# Patient Record
Sex: MALE | Employment: UNEMPLOYED | URBAN - METROPOLITAN AREA
[De-identification: names, ages, dates, MRNs, and addresses within clinical notes are randomized per-mention and may not be internally consistent; named-entity substitution may affect disease eponyms.]

---

## 2017-03-20 ENCOUNTER — OFFICE VISIT (OUTPATIENT)
Dept: INTERNAL MEDICINE CLINIC | Age: 37
End: 2017-03-20

## 2017-03-20 VITALS
HEART RATE: 96 BPM | WEIGHT: 125.2 LBS | BODY MASS INDEX: 20.12 KG/M2 | RESPIRATION RATE: 16 BRPM | TEMPERATURE: 98.6 F | OXYGEN SATURATION: 99 % | SYSTOLIC BLOOD PRESSURE: 128 MMHG | HEIGHT: 66 IN | DIASTOLIC BLOOD PRESSURE: 88 MMHG

## 2017-03-20 DIAGNOSIS — Z76.89 ENCOUNTER TO ESTABLISH CARE WITH NEW DOCTOR: Primary | ICD-10-CM

## 2017-03-20 DIAGNOSIS — I10 ESSENTIAL HYPERTENSION: ICD-10-CM

## 2017-03-20 DIAGNOSIS — Z72.0 TOBACCO ABUSE: ICD-10-CM

## 2017-03-20 DIAGNOSIS — J44.9 CHRONIC OBSTRUCTIVE PULMONARY DISEASE, UNSPECIFIED COPD TYPE (HCC): ICD-10-CM

## 2017-03-20 DIAGNOSIS — R56.9 CONVULSIONS, UNSPECIFIED CONVULSION TYPE (HCC): ICD-10-CM

## 2017-03-20 DIAGNOSIS — F20.9 SCHIZOPHRENIA, UNSPECIFIED TYPE (HCC): ICD-10-CM

## 2017-03-20 DIAGNOSIS — Z13.9 SCREENING: ICD-10-CM

## 2017-03-20 RX ORDER — TRAZODONE HYDROCHLORIDE 50 MG/1
50 TABLET ORAL
Qty: 30 TAB | Refills: 1 | Status: SHIPPED | OUTPATIENT
Start: 2017-03-20 | End: 2018-05-16 | Stop reason: ALTCHOICE

## 2017-03-20 RX ORDER — BUDESONIDE AND FORMOTEROL FUMARATE DIHYDRATE 160; 4.5 UG/1; UG/1
AEROSOL RESPIRATORY (INHALATION)
Refills: 3 | COMMUNITY
Start: 2017-03-10 | End: 2018-05-16 | Stop reason: SDUPTHER

## 2017-03-20 RX ORDER — AZITHROMYCIN 250 MG/1
TABLET, FILM COATED ORAL
Refills: 3 | COMMUNITY
Start: 2017-03-10 | End: 2018-05-16 | Stop reason: ALTCHOICE

## 2017-03-20 RX ORDER — ALBUTEROL SULFATE 90 UG/1
AEROSOL, METERED RESPIRATORY (INHALATION)
Refills: 3 | COMMUNITY
Start: 2017-03-10 | End: 2018-06-27 | Stop reason: SDUPTHER

## 2017-03-20 RX ORDER — AMLODIPINE BESYLATE 5 MG/1
TABLET ORAL
Refills: 0 | COMMUNITY
Start: 2017-03-08 | End: 2018-06-11 | Stop reason: SDUPTHER

## 2017-03-20 NOTE — PROGRESS NOTES
Chief Complaint   Patient presents with   [address]     Pt is a new patient. Pt concerned about his BP, pt states he has seizures and needs medication to control them.
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
## 2017-03-20 NOTE — MR AVS SNAPSHOT
Visit Information Date & Time Provider Department Dept. Phone Encounter #  
 3/20/2017 10:15 AM Ted Slavador MD [facility] [address] [id number] Follow-up Instructions Return in about 1 month (around 4/20/2017) for Physical . Upcoming Health Maintenance Date Due Pneumococcal 19-64 Medium Risk (1 of 1 - PPSV23) 9/16/1997 DTaP/Tdap/Td series (1 - Tdap) 9/16/1999 INFLUENZA AGE 9 TO ADULT 8/1/2016 Allergies as of 3/20/2017  Review Complete On: 3/20/2017 By: Roby Richmond LPN Severity Noted Reaction Type Reactions Codeine  01/05/2011    Other (comments) Seroquel [Quetiapine]  01/05/2011    Other (comments) tremors Current Immunizations  Reviewed on 1/22/2013 No immunizations on file. Not reviewed this visit You Were Diagnosed With   
  
 Codes Comments Encounter to establish care with new doctor    -  Primary ICD-10-CM: Z71.89 ICD-9-CM: V65.8 Chronic obstructive pulmonary disease, unspecified COPD type ([facility]     ICD-10-CM: J44.9 ICD-9-CM: 855 Convulsions, unspecified convulsion type ([facility]     ICD-10-CM: R56.9 ICD-9-CM: 780.39 Schizophrenia, unspecified type ([facility]     ICD-10-CM: F20.9 Essential hypertension     ICD-10-CM: I10 
ICD-9-CM: 401.9 Tobacco abuse     ICD-10-CM: Z72.0 ICD-9-CM: 305.1 Screening     ICD-10-CM: Z13.9 ICD-9-CM: V82.9 Vitals BP Pulse Temp Resp Height(growth percentile) Weight(growth percentile) 128/88 (BP 1 Location: Right arm, BP Patient Position: Sitting) 96 98.6 °F (37 °C) (Oral) 16 5' 6\" (1.676 m) 125 lb 3.2 oz (56.8 kg) SpO2 BMI Smoking Status 99% 20.21 kg/m2 Current Every Day Smoker Vitals History BMI and BSA Data Body Mass Index Body Surface Area  
 20.21 kg/m 2 1.63 m 2 Preferred Pharmacy Pharmacy Name Phone  [address], [address] [facility] Sue Aguilera [phone] Your Updated Medication List  
  
   
This list is accurate as of: 3/20/17 11:20 AM.  Always use your most recent med list. amLODIPine 5 mg tablet Commonly known as:  [place] TK 1 T PO QD  
  
 azithromycin 250 mg tablet Commonly known as:  ZITHROMAX  
  
 guaiFENesin 1,200 mg Ta12 ER tablet Commonly known as:  Matthew & Matthew TK 1 T PO TWICE A DAY  
  
 SYMBICORT 160-4.5 mcg/actuation HFA inhaler Generic drug:  budesonide-formoterol INHALE 2 PUFFS PO 2 TIMES DAILY. RINSE MOUTH AFTER USE  
  
 traZODone 50 mg tablet Commonly known as:  García Viviane Take 1 Tab by mouth nightly. VENTOLIN HFA 90 mcg/actuation inhaler Generic drug:  albuterol INL 2 PFS PO Q 4 H PRN Prescriptions Printed Refills  
 traZODone (DESYREL) 50 mg tablet 1 Sig: Take 1 Tab by mouth nightly. Class: Print Route: Oral  
  
We Performed the Following CBC WITH AUTOMATED DIFF [91033 CPT(R)] HIV 1/2 AG/AB, 4TH GENERATION,W RFLX CONFIRM [[id number] Custom] METABOLIC PANEL, COMPREHENSIVE [41149 CPT(R)] RPR [73590 CPT(R)] TSH 3RD GENERATION [64017 CPT(R)] URINALYSIS W/MICROSCOPIC [14042 CPT(R)] Follow-up Instructions Return in about 1 month (around 4/20/2017) for Physical . Introducing [facility] & HEALTH SERVICES! Raine Gross introduces [email or web address] patient portal. Now you can access parts of your medical record, email your doctor's office, and request medication refills online. 1. In your internet browser, go to [email or web address] 2. Click on the First Time User? Click Here link in the Sign In box. You will see the New Member Sign Up page. 3. Enter your [email or web address] Access Code exactly as it appears below. You will not need to use this code after youve completed the sign-up process. If you do not sign up before the expiration date, you must request a new code.  
 
· [email or web address] Access Code: [email or web address] 
 Expires: 6/18/2017 10:58 AM 
 
4. Enter the last four digits of your Social Security Number (xxxx) and Date of Birth (mm/dd/yyyy) as indicated and click Submit. You will be taken to the next sign-up page. 5. Create a SocialMedia305 ID. This will be your [email or web address] login ID and cannot be changed, so think of one that is secure and easy to remember. 6. Create a SocialMedia305 password. You can change your password at any time. 7. Enter your Password Reset Question and Answer. This can be used at a later time if you forget your password. 8. Enter your e-mail address. You will receive e-mail notification when new information is available in [address] 9. Click Sign Up. You can now view and download portions of your medical record. 10. Click the Download Summary menu link to download a portable copy of your medical information. If you have questions, please visit the Frequently Asked Questions section of the [email or web address] website. Remember, SocialMedia305 is NOT to be used for urgent needs. For medical emergencies, dial 911. Now available from your iPhone and Android! Please provide this summary of care documentation to your next provider. Your primary care clinician is listed as Mouna Bailey. If you have any questions after today's visit, please call [phone].

## 2017-03-21 LAB
ALBUMIN SERPL-MCNC: 4.3 G/DL (ref 3.5–5.5)
ALBUMIN/GLOB SERPL: 1.7 {RATIO} (ref 1.2–2.2)
ALP SERPL-CCNC: 79 IU/L (ref 39–117)
ALT SERPL-CCNC: 24 IU/L (ref 0–44)
APPEARANCE UR: CLEAR
AST SERPL-CCNC: 16 IU/L (ref 0–40)
BACTERIA #/AREA URNS HPF: NORMAL /[HPF]
BASOPHILS # BLD AUTO: 0 X10E3/UL (ref 0–0.2)
BASOPHILS NFR BLD AUTO: 0 %
BILIRUB SERPL-MCNC: <0.2 MG/DL (ref 0–1.2)
BILIRUB UR QL STRIP: NEGATIVE
BUN SERPL-MCNC: 13 MG/DL (ref 6–20)
BUN/CREAT SERPL: 15 (ref 8–19)
CALCIUM SERPL-MCNC: 9.4 MG/DL (ref 8.7–10.2)
CASTS URNS QL MICRO: NORMAL /LPF
CHLORIDE SERPL-SCNC: 100 MMOL/L (ref 96–106)
CO2 SERPL-SCNC: 23 MMOL/L (ref 18–29)
COLOR UR: YELLOW
CREAT SERPL-MCNC: 0.87 MG/DL (ref 0.76–1.27)
EOSINOPHIL # BLD AUTO: 0.1 X10E3/UL (ref 0–0.4)
EOSINOPHIL NFR BLD AUTO: 0 %
EPI CELLS #/AREA URNS HPF: NORMAL /HPF
ERYTHROCYTE [DISTWIDTH] IN BLOOD BY AUTOMATED COUNT: 22.1 % (ref 12.3–15.4)
GLOBULIN SER CALC-MCNC: 2.5 G/DL (ref 1.5–4.5)
GLUCOSE SERPL-MCNC: 115 MG/DL (ref 65–99)
GLUCOSE UR QL: NEGATIVE
HCT VFR BLD AUTO: 35.7 % (ref 37.5–51)
HGB BLD-MCNC: 11.3 G/DL (ref 12.6–17.7)
HGB UR QL STRIP: NEGATIVE
HIV 1+2 AB+HIV1 P24 AG SERPL QL IA: NON REACTIVE
IMM GRANULOCYTES # BLD: 0 X10E3/UL (ref 0–0.1)
IMM GRANULOCYTES NFR BLD: 0 %
KETONES UR QL STRIP: NEGATIVE
LEUKOCYTE ESTERASE UR QL STRIP: NEGATIVE
LYMPHOCYTES # BLD AUTO: 2.6 X10E3/UL (ref 0.7–3.1)
LYMPHOCYTES NFR BLD AUTO: 21 %
MCH RBC QN AUTO: 25.7 PG (ref 26.6–33)
MCHC RBC AUTO-ENTMCNC: 31.7 G/DL (ref 31.5–35.7)
MCV RBC AUTO: 81 FL (ref 79–97)
MICRO URNS: NORMAL
MICRO URNS: NORMAL
MONOCYTES # BLD AUTO: 0.8 X10E3/UL (ref 0.1–0.9)
MONOCYTES NFR BLD AUTO: 7 %
MORPHOLOGY BLD-IMP: ABNORMAL
MUCOUS THREADS URNS QL MICRO: PRESENT
NEUTROPHILS # BLD AUTO: 8.9 X10E3/UL (ref 1.4–7)
NEUTROPHILS NFR BLD AUTO: 72 %
NITRITE UR QL STRIP: NEGATIVE
PH UR STRIP: 6 [PH] (ref 5–7.5)
PLATELET # BLD AUTO: 478 X10E3/UL (ref 150–379)
POTASSIUM SERPL-SCNC: 4.3 MMOL/L (ref 3.5–5.2)
PROT SERPL-MCNC: 6.8 G/DL (ref 6–8.5)
PROT UR QL STRIP: NEGATIVE
RBC # BLD AUTO: 4.4 X10E6/UL (ref 4.14–5.8)
RBC #/AREA URNS HPF: NORMAL /HPF
RPR SER QL: NON REACTIVE
SODIUM SERPL-SCNC: 141 MMOL/L (ref 134–144)
SP GR UR: 1.02 (ref 1–1.03)
TSH SERPL DL<=0.005 MIU/L-ACNC: 0.88 UIU/ML (ref 0.45–4.5)
UROBILINOGEN UR STRIP-MCNC: 0.2 MG/DL (ref 0.2–1)
WBC # BLD AUTO: 12.4 X10E3/UL (ref 3.4–10.8)
WBC #/AREA URNS HPF: NORMAL /HPF

## 2017-05-11 ENCOUNTER — TELEPHONE (OUTPATIENT)
Dept: INTERNAL MEDICINE CLINIC | Age: 37
End: 2017-05-11
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
## 2018-01-01 ENCOUNTER — TELEPHONE (OUTPATIENT)
Dept: INTERNAL MEDICINE CLINIC | Age: 38
End: 2018-01-01

## 2018-01-01 ENCOUNTER — ANESTHESIA EVENT (OUTPATIENT)
Dept: ENDOSCOPY | Age: 38
End: 2018-01-01
Payer: MEDICAID

## 2018-01-01 ENCOUNTER — TELEPHONE (OUTPATIENT)
Dept: BEHAVIORAL/MENTAL HEALTH CLINIC | Age: 38
End: 2018-01-01

## 2018-01-01 ENCOUNTER — OFFICE VISIT (OUTPATIENT)
Dept: NEUROLOGY | Age: 38
End: 2018-01-01

## 2018-01-01 ENCOUNTER — OFFICE VISIT (OUTPATIENT)
Dept: BEHAVIORAL/MENTAL HEALTH CLINIC | Age: 38
End: 2018-01-01

## 2018-01-01 ENCOUNTER — OFFICE VISIT (OUTPATIENT)
Dept: INTERNAL MEDICINE CLINIC | Age: 38
End: 2018-01-01

## 2018-01-01 ENCOUNTER — ANESTHESIA (OUTPATIENT)
Dept: ENDOSCOPY | Age: 38
End: 2018-01-01
Payer: MEDICAID

## 2018-01-01 ENCOUNTER — HOSPITAL ENCOUNTER (OUTPATIENT)
Age: 38
Setting detail: OUTPATIENT SURGERY
Discharge: HOME | End: 2018-10-15
Attending: SPECIALIST | Admitting: SPECIALIST
Payer: MEDICAID

## 2018-01-01 VITALS
SYSTOLIC BLOOD PRESSURE: 179 MMHG | WEIGHT: 136.4 LBS | HEART RATE: 113 BPM | BODY MASS INDEX: 20.67 KG/M2 | HEIGHT: 68 IN | DIASTOLIC BLOOD PRESSURE: 96 MMHG

## 2018-01-01 VITALS
HEART RATE: 116 BPM | OXYGEN SATURATION: 91 % | RESPIRATION RATE: 18 BRPM | BODY MASS INDEX: 20.92 KG/M2 | WEIGHT: 138 LBS | HEIGHT: 68 IN | DIASTOLIC BLOOD PRESSURE: 84 MMHG | SYSTOLIC BLOOD PRESSURE: 122 MMHG

## 2018-01-01 VITALS
BODY MASS INDEX: 18.79 KG/M2 | DIASTOLIC BLOOD PRESSURE: 73 MMHG | RESPIRATION RATE: 23 BRPM | HEART RATE: 90 BPM | SYSTOLIC BLOOD PRESSURE: 120 MMHG | WEIGHT: 124 LBS | OXYGEN SATURATION: 97 % | TEMPERATURE: 98.4 F | HEIGHT: 68 IN

## 2018-01-01 VITALS
WEIGHT: 126.2 LBS | DIASTOLIC BLOOD PRESSURE: 85 MMHG | BODY MASS INDEX: 19.13 KG/M2 | HEART RATE: 91 BPM | SYSTOLIC BLOOD PRESSURE: 140 MMHG | HEIGHT: 68 IN | TEMPERATURE: 98.5 F | OXYGEN SATURATION: 96 % | RESPIRATION RATE: 20 BRPM

## 2018-01-01 VITALS
HEIGHT: 68 IN | SYSTOLIC BLOOD PRESSURE: 129 MMHG | HEART RATE: 94 BPM | DIASTOLIC BLOOD PRESSURE: 79 MMHG | RESPIRATION RATE: 18 BRPM | WEIGHT: 128.9 LBS | BODY MASS INDEX: 19.54 KG/M2 | OXYGEN SATURATION: 92 % | TEMPERATURE: 98 F

## 2018-01-01 DIAGNOSIS — Z72.0 TOBACCO ABUSE: ICD-10-CM

## 2018-01-01 DIAGNOSIS — F19.951: ICD-10-CM

## 2018-01-01 DIAGNOSIS — G47.00 INSOMNIA, UNSPECIFIED TYPE: ICD-10-CM

## 2018-01-01 DIAGNOSIS — F19.951: Primary | ICD-10-CM

## 2018-01-01 DIAGNOSIS — F20.9 SCHIZOPHRENIA, UNSPECIFIED TYPE (HCC): ICD-10-CM

## 2018-01-01 DIAGNOSIS — D50.9 IRON DEFICIENCY ANEMIA, UNSPECIFIED IRON DEFICIENCY ANEMIA TYPE: ICD-10-CM

## 2018-01-01 DIAGNOSIS — F43.10 PTSD (POST-TRAUMATIC STRESS DISORDER): ICD-10-CM

## 2018-01-01 DIAGNOSIS — F41.9 ANXIETY: ICD-10-CM

## 2018-01-01 DIAGNOSIS — F19.94 SUBSTANCE INDUCED MOOD DISORDER (HCC): ICD-10-CM

## 2018-01-01 DIAGNOSIS — G40.409 SYMPTOMATIC GENERALIZED EPILEPSY (HCC): Primary | ICD-10-CM

## 2018-01-01 DIAGNOSIS — E11.9 TYPE 2 DIABETES MELLITUS WITHOUT COMPLICATION, WITHOUT LONG-TERM CURRENT USE OF INSULIN (HCC): Primary | ICD-10-CM

## 2018-01-01 DIAGNOSIS — J44.9 COPD WITH ASTHMA (HCC): ICD-10-CM

## 2018-01-01 DIAGNOSIS — F33.1 DEPRESSION, MAJOR, RECURRENT, MODERATE (HCC): ICD-10-CM

## 2018-01-01 DIAGNOSIS — I10 ESSENTIAL HYPERTENSION: ICD-10-CM

## 2018-01-01 DIAGNOSIS — R56.9 SEIZURES (HCC): ICD-10-CM

## 2018-01-01 LAB
ALBUMIN SERPL ELPH-MCNC: 4.2 G/DL (ref 2.9–4.4)
ALBUMIN/CREAT UR: <4.2 MG/G CREAT (ref 0–30)
ALBUMIN/GLOB SERPL: 1.2 {RATIO} (ref 0.7–1.7)
ALPHA1 GLOB SERPL ELPH-MCNC: 0.4 G/DL (ref 0–0.4)
ALPHA2 GLOB SERPL ELPH-MCNC: 1.1 G/DL (ref 0.4–1)
B-GLOBULIN SERPL ELPH-MCNC: 1.5 G/DL (ref 0.7–1.3)
BASOPHILS # BLD AUTO: 0 X10E3/UL (ref 0–0.2)
BASOPHILS # BLD: 0 K/UL (ref 0–0.1)
BASOPHILS NFR BLD AUTO: 0 %
BASOPHILS NFR BLD: 0 % (ref 0–1)
CREAT UR-MCNC: 70.7 MG/DL
DIFFERENTIAL METHOD BLD: ABNORMAL
EOSINOPHIL # BLD AUTO: 0.2 X10E3/UL (ref 0–0.4)
EOSINOPHIL # BLD: 0.2 K/UL (ref 0–0.4)
EOSINOPHIL NFR BLD AUTO: 3 %
EOSINOPHIL NFR BLD: 2 % (ref 0–7)
ERYTHROCYTE [DISTWIDTH] IN BLOOD BY AUTOMATED COUNT: 16.4 % (ref 12.3–15.4)
ERYTHROCYTE [DISTWIDTH] IN BLOOD BY AUTOMATED COUNT: 16.5 % (ref 11.5–14.5)
FERRITIN SERPL-MCNC: 11 NG/ML (ref 30–400)
FOLATE SERPL-MCNC: 4.1 NG/ML
GAMMA GLOB SERPL ELPH-MCNC: 0.8 G/DL (ref 0.4–1.8)
GLOBULIN SER-MCNC: 3.8 G/DL (ref 2.2–3.9)
GLUCOSE BLD STRIP.AUTO-MCNC: 131 MG/DL (ref 65–100)
HCT VFR BLD AUTO: 33 % (ref 36.6–50.3)
HCT VFR BLD AUTO: 38.5 % (ref 37.5–51)
HGB BLD-MCNC: 11.7 G/DL (ref 13–17.7)
HGB BLD-MCNC: 9.8 G/DL (ref 12.1–17)
IGA SERPL-MCNC: 387 MG/DL (ref 90–386)
IGG SERPL-MCNC: 807 MG/DL (ref 700–1600)
IGM SERPL-MCNC: 57 MG/DL (ref 20–172)
IMM GRANULOCYTES # BLD: 0 K/UL (ref 0–0.04)
IMM GRANULOCYTES # BLD: 0 X10E3/UL (ref 0–0.1)
IMM GRANULOCYTES NFR BLD AUTO: 0 % (ref 0–0.5)
IMM GRANULOCYTES NFR BLD: 0 %
INTERPRETATION SERPL IEP-IMP: ABNORMAL
IRON SATN MFR SERPL: 5 % (ref 15–55)
IRON SERPL-MCNC: 21 UG/DL (ref 38–169)
KAPPA LC FREE SER-MCNC: 23 MG/L (ref 3.3–19.4)
KAPPA LC FREE/LAMBDA FREE SER: 1.19 {RATIO} (ref 0.26–1.65)
LAMBDA LC FREE SERPL-MCNC: 19.4 MG/L (ref 5.7–26.3)
LYMPHOCYTES # BLD AUTO: 1.7 X10E3/UL (ref 0.7–3.1)
LYMPHOCYTES # BLD: 1.1 K/UL (ref 0.8–3.5)
LYMPHOCYTES NFR BLD AUTO: 20 %
LYMPHOCYTES NFR BLD: 11 % (ref 12–49)
M PROTEIN SERPL ELPH-MCNC: ABNORMAL G/DL
MCH RBC QN AUTO: 25 PG (ref 26.6–33)
MCH RBC QN AUTO: 25.5 PG (ref 26–34)
MCHC RBC AUTO-ENTMCNC: 29.7 G/DL (ref 30–36.5)
MCHC RBC AUTO-ENTMCNC: 30.4 G/DL (ref 31.5–35.7)
MCV RBC AUTO: 82 FL (ref 79–97)
MCV RBC AUTO: 85.7 FL (ref 80–99)
MICROALBUMIN UR-MCNC: <3 UG/ML
MONOCYTES # BLD AUTO: 0.7 X10E3/UL (ref 0.1–0.9)
MONOCYTES # BLD: 0.6 K/UL (ref 0–1)
MONOCYTES NFR BLD AUTO: 8 %
MONOCYTES NFR BLD: 6 % (ref 5–13)
NEUTROPHILS # BLD AUTO: 5.6 X10E3/UL (ref 1.4–7)
NEUTROPHILS NFR BLD AUTO: 69 %
NEUTS SEG # BLD: 8.4 K/UL (ref 1.8–8)
NEUTS SEG NFR BLD: 81 % (ref 32–75)
NRBC # BLD: 0 K/UL (ref 0–0.01)
NRBC BLD-RTO: 0 PER 100 WBC
PLATELET # BLD AUTO: 355 K/UL (ref 150–400)
PLATELET # BLD AUTO: 434 X10E3/UL (ref 150–379)
PLEASE NOTE:, 149534: ABNORMAL
PMV BLD AUTO: 9 FL (ref 8.9–12.9)
PROT SERPL-MCNC: 8 G/DL (ref 6–8.5)
RBC # BLD AUTO: 3.85 M/UL (ref 4.1–5.7)
RBC # BLD AUTO: 4.68 X10E6/UL (ref 4.14–5.8)
RETICS/RBC NFR AUTO: 1.4 % (ref 0.6–2.6)
SERVICE CMNT-IMP: ABNORMAL
TIBC SERPL-MCNC: 465 UG/DL (ref 250–450)
UIBC SERPL-MCNC: 444 UG/DL (ref 111–343)
VIT B12 SERPL-MCNC: 401 PG/ML (ref 232–1245)
WBC # BLD AUTO: 10.4 K/UL (ref 4.1–11.1)
WBC # BLD AUTO: 8.2 X10E3/UL (ref 3.4–10.8)

## 2018-01-01 PROCEDURE — 94640 AIRWAY INHALATION TREATMENT: CPT

## 2018-01-01 PROCEDURE — 76060000031 HC ANESTHESIA FIRST 0.5 HR: Performed by: SPECIALIST

## 2018-01-01 PROCEDURE — 74011000250 HC RX REV CODE- 250: Performed by: ANESTHESIOLOGY

## 2018-01-01 PROCEDURE — 88305 TISSUE EXAM BY PATHOLOGIST: CPT | Performed by: SPECIALIST

## 2018-01-01 PROCEDURE — 76040000019: Performed by: SPECIALIST

## 2018-01-01 PROCEDURE — 82962 GLUCOSE BLOOD TEST: CPT

## 2018-01-01 PROCEDURE — 74011250636 HC RX REV CODE- 250/636

## 2018-01-01 PROCEDURE — 85025 COMPLETE CBC W/AUTO DIFF WBC: CPT | Performed by: SPECIALIST

## 2018-01-01 PROCEDURE — 77030009426 HC FCPS BIOP ENDOSC BSC -B: Performed by: SPECIALIST

## 2018-01-01 PROCEDURE — 74011000250 HC RX REV CODE- 250

## 2018-01-01 PROCEDURE — 36415 COLL VENOUS BLD VENIPUNCTURE: CPT | Performed by: SPECIALIST

## 2018-01-01 RX ORDER — DEXMEDETOMIDINE HYDROCHLORIDE 4 UG/ML
INJECTION, SOLUTION INTRAVENOUS AS NEEDED
Status: DISCONTINUED | OUTPATIENT
Start: 2018-01-01 | End: 2018-01-01 | Stop reason: HOSPADM

## 2018-01-01 RX ORDER — FENTANYL CITRATE 50 UG/ML
200 INJECTION, SOLUTION INTRAMUSCULAR; INTRAVENOUS
Status: DISCONTINUED | OUTPATIENT
Start: 2018-01-01 | End: 2018-01-01 | Stop reason: HOSPADM

## 2018-01-01 RX ORDER — SERTRALINE HYDROCHLORIDE 100 MG/1
150 TABLET, FILM COATED ORAL
Qty: 135 TAB | Refills: 0 | Status: SHIPPED | OUTPATIENT
Start: 2018-01-01 | End: 2018-01-01 | Stop reason: SDUPTHER

## 2018-01-01 RX ORDER — TRAZODONE HYDROCHLORIDE 100 MG/1
100-200 TABLET ORAL
Qty: 180 TAB | Refills: 0 | Status: SHIPPED | OUTPATIENT
Start: 2018-01-01 | End: 2019-01-01 | Stop reason: SDUPTHER

## 2018-01-01 RX ORDER — ARIPIPRAZOLE 5 MG/1
TABLET ORAL
Qty: 90 TAB | Refills: 0 | Status: SHIPPED | OUTPATIENT
Start: 2018-01-01 | End: 2019-01-01

## 2018-01-01 RX ORDER — SODIUM CHLORIDE 0.9 % (FLUSH) 0.9 %
5-10 SYRINGE (ML) INJECTION AS NEEDED
Status: DISCONTINUED | OUTPATIENT
Start: 2018-01-01 | End: 2018-01-01 | Stop reason: HOSPADM

## 2018-01-01 RX ORDER — SODIUM CHLORIDE 9 MG/ML
50 INJECTION, SOLUTION INTRAVENOUS CONTINUOUS
Status: DISCONTINUED | OUTPATIENT
Start: 2018-01-01 | End: 2018-01-01 | Stop reason: HOSPADM

## 2018-01-01 RX ORDER — GABAPENTIN 100 MG/1
100 CAPSULE ORAL 3 TIMES DAILY
Qty: 90 CAP | Refills: 3 | Status: SHIPPED | OUTPATIENT
Start: 2018-01-01 | End: 2018-01-01 | Stop reason: SDUPTHER

## 2018-01-01 RX ORDER — TIOTROPIUM BROMIDE 18 UG/1
1 CAPSULE ORAL; RESPIRATORY (INHALATION) DAILY
COMMUNITY
Start: 2018-08-09

## 2018-01-01 RX ORDER — LORAZEPAM 2 MG/ML
2 INJECTION INTRAMUSCULAR AS NEEDED
Status: DISCONTINUED | OUTPATIENT
Start: 2018-01-01 | End: 2018-01-01 | Stop reason: HOSPADM

## 2018-01-01 RX ORDER — ATROPINE SULFATE 0.1 MG/ML
0.5 INJECTION INTRAVENOUS
Status: DISCONTINUED | OUTPATIENT
Start: 2018-01-01 | End: 2018-01-01 | Stop reason: HOSPADM

## 2018-01-01 RX ORDER — ARIPIPRAZOLE 5 MG/1
TABLET ORAL
Qty: 90 TAB | Refills: 0 | Status: SHIPPED | OUTPATIENT
Start: 2018-01-01 | End: 2018-01-01 | Stop reason: SDUPTHER

## 2018-01-01 RX ORDER — TRAZODONE HYDROCHLORIDE 100 MG/1
100-200 TABLET ORAL
Qty: 180 TAB | Refills: 0 | Status: SHIPPED | OUTPATIENT
Start: 2018-01-01 | End: 2018-01-01 | Stop reason: SDUPTHER

## 2018-01-01 RX ORDER — MIDAZOLAM HYDROCHLORIDE 1 MG/ML
.25-1 INJECTION, SOLUTION INTRAMUSCULAR; INTRAVENOUS
Status: DISCONTINUED | OUTPATIENT
Start: 2018-01-01 | End: 2018-01-01 | Stop reason: HOSPADM

## 2018-01-01 RX ORDER — DEXTROMETHORPHAN/PSEUDOEPHED 2.5-7.5/.8
1.2 DROPS ORAL
Status: DISCONTINUED | OUTPATIENT
Start: 2018-01-01 | End: 2018-01-01 | Stop reason: HOSPADM

## 2018-01-01 RX ORDER — SODIUM CHLORIDE 9 MG/ML
INJECTION, SOLUTION INTRAVENOUS
Status: DISCONTINUED | OUTPATIENT
Start: 2018-01-01 | End: 2018-01-01 | Stop reason: HOSPADM

## 2018-01-01 RX ORDER — PRAZOSIN HYDROCHLORIDE 2 MG/1
2 CAPSULE ORAL
Qty: 90 CAP | Refills: 0 | Status: SHIPPED | OUTPATIENT
Start: 2018-01-01 | End: 2019-01-01 | Stop reason: SDUPTHER

## 2018-01-01 RX ORDER — NALOXONE HYDROCHLORIDE 0.4 MG/ML
0.4 INJECTION, SOLUTION INTRAMUSCULAR; INTRAVENOUS; SUBCUTANEOUS
Status: DISCONTINUED | OUTPATIENT
Start: 2018-01-01 | End: 2018-01-01 | Stop reason: HOSPADM

## 2018-01-01 RX ORDER — FLUMAZENIL 0.1 MG/ML
0.2 INJECTION INTRAVENOUS
Status: DISCONTINUED | OUTPATIENT
Start: 2018-01-01 | End: 2018-01-01 | Stop reason: HOSPADM

## 2018-01-01 RX ORDER — IPRATROPIUM BROMIDE AND ALBUTEROL SULFATE 2.5; .5 MG/3ML; MG/3ML
3 SOLUTION RESPIRATORY (INHALATION)
Status: COMPLETED | OUTPATIENT
Start: 2018-01-01 | End: 2018-01-01

## 2018-01-01 RX ORDER — SILDENAFIL 100 MG/1
100 TABLET, FILM COATED ORAL AS NEEDED
Qty: 10 TAB | Refills: 11 | Status: SHIPPED | OUTPATIENT
Start: 2018-01-01

## 2018-01-01 RX ORDER — LIDOCAINE HYDROCHLORIDE 20 MG/ML
INJECTION, SOLUTION EPIDURAL; INFILTRATION; INTRACAUDAL; PERINEURAL AS NEEDED
Status: DISCONTINUED | OUTPATIENT
Start: 2018-01-01 | End: 2018-01-01 | Stop reason: HOSPADM

## 2018-01-01 RX ORDER — SODIUM CHLORIDE 0.9 % (FLUSH) 0.9 %
5-10 SYRINGE (ML) INJECTION EVERY 8 HOURS
Status: DISCONTINUED | OUTPATIENT
Start: 2018-01-01 | End: 2018-01-01 | Stop reason: HOSPADM

## 2018-01-01 RX ORDER — EPINEPHRINE 0.1 MG/ML
1 INJECTION INTRACARDIAC; INTRAVENOUS
Status: DISCONTINUED | OUTPATIENT
Start: 2018-01-01 | End: 2018-01-01 | Stop reason: HOSPADM

## 2018-01-01 RX ORDER — GABAPENTIN 100 MG/1
100 CAPSULE ORAL 3 TIMES DAILY
Qty: 90 CAP | Refills: 3 | Status: SHIPPED | OUTPATIENT
Start: 2018-01-01

## 2018-01-01 RX ORDER — SERTRALINE HYDROCHLORIDE 100 MG/1
200 TABLET, FILM COATED ORAL
Qty: 180 TAB | Refills: 0 | Status: SHIPPED | OUTPATIENT
Start: 2018-01-01 | End: 2019-01-01

## 2018-01-01 RX ORDER — PROPOFOL 10 MG/ML
INJECTION, EMULSION INTRAVENOUS AS NEEDED
Status: DISCONTINUED | OUTPATIENT
Start: 2018-01-01 | End: 2018-01-01 | Stop reason: HOSPADM

## 2018-01-01 RX ADMIN — IPRATROPIUM BROMIDE AND ALBUTEROL SULFATE 3 ML: .5; 3 SOLUTION RESPIRATORY (INHALATION) at 09:53

## 2018-01-01 RX ADMIN — DEXMEDETOMIDINE HYDROCHLORIDE 4 MCG: 4 INJECTION, SOLUTION INTRAVENOUS at 10:23

## 2018-01-01 RX ADMIN — PROPOFOL 20 MG: 10 INJECTION, EMULSION INTRAVENOUS at 10:21

## 2018-01-01 RX ADMIN — PROPOFOL 50 MG: 10 INJECTION, EMULSION INTRAVENOUS at 10:17

## 2018-01-01 RX ADMIN — SODIUM CHLORIDE: 9 INJECTION, SOLUTION INTRAVENOUS at 09:54

## 2018-01-01 RX ADMIN — DEXMEDETOMIDINE HYDROCHLORIDE 4 MCG: 4 INJECTION, SOLUTION INTRAVENOUS at 10:20

## 2018-01-01 RX ADMIN — LIDOCAINE HYDROCHLORIDE 60 MG: 20 INJECTION, SOLUTION EPIDURAL; INFILTRATION; INTRACAUDAL; PERINEURAL at 10:17

## 2018-01-01 RX ADMIN — PROPOFOL 30 MG: 10 INJECTION, EMULSION INTRAVENOUS at 10:23

## 2018-01-01 RX ADMIN — PROPOFOL 20 MG: 10 INJECTION, EMULSION INTRAVENOUS at 10:27

## 2018-01-01 RX ADMIN — PROPOFOL 50 MG: 10 INJECTION, EMULSION INTRAVENOUS at 10:18

## 2018-05-16 ENCOUNTER — OFFICE VISIT (OUTPATIENT)
Dept: INTERNAL MEDICINE CLINIC | Age: 38
End: 2018-05-16

## 2018-05-16 VITALS
OXYGEN SATURATION: 95 % | WEIGHT: 128.7 LBS | BODY MASS INDEX: 20.68 KG/M2 | SYSTOLIC BLOOD PRESSURE: 112 MMHG | HEIGHT: 66 IN | DIASTOLIC BLOOD PRESSURE: 76 MMHG | TEMPERATURE: 98 F | RESPIRATION RATE: 20 BRPM | HEART RATE: 117 BPM

## 2018-05-16 DIAGNOSIS — Z72.0 TOBACCO ABUSE: ICD-10-CM

## 2018-05-16 DIAGNOSIS — F20.9 SCHIZOPHRENIA, UNSPECIFIED TYPE (HCC): ICD-10-CM

## 2018-05-16 DIAGNOSIS — F14.11 COCAINE ABUSE IN REMISSION (HCC): ICD-10-CM

## 2018-05-16 DIAGNOSIS — F43.10 PTSD (POST-TRAUMATIC STRESS DISORDER): ICD-10-CM

## 2018-05-16 DIAGNOSIS — J44.9 COPD WITH ASTHMA (HCC): Primary | ICD-10-CM

## 2018-05-16 DIAGNOSIS — R56.9 SEIZURES (HCC): ICD-10-CM

## 2018-05-16 DIAGNOSIS — I10 ESSENTIAL HYPERTENSION: ICD-10-CM

## 2018-05-16 RX ORDER — LEVETIRACETAM 500 MG/1
1000 TABLET ORAL 2 TIMES DAILY
Qty: 60 TAB | Refills: 2 | Status: SHIPPED | OUTPATIENT
Start: 2018-05-16 | End: 2018-06-11 | Stop reason: SDUPTHER

## 2018-05-16 RX ORDER — ALBUTEROL SULFATE 0.83 MG/ML
2.5 SOLUTION RESPIRATORY (INHALATION)
Qty: 100 EACH | Refills: 11
Start: 2018-05-16 | End: 2018-06-11 | Stop reason: SDUPTHER

## 2018-05-16 RX ORDER — HALOPERIDOL 5 MG/1
5 TABLET ORAL
Qty: 30 TAB | Refills: 1 | Status: SHIPPED | OUTPATIENT
Start: 2018-05-16 | End: 2018-06-11 | Stop reason: SDUPTHER

## 2018-05-16 RX ORDER — ALBUTEROL SULFATE 90 UG/1
2 AEROSOL, METERED RESPIRATORY (INHALATION)
Qty: 1 INHALER | Refills: 11 | Status: SHIPPED | OUTPATIENT
Start: 2018-05-16 | End: 2018-06-11 | Stop reason: SDUPTHER

## 2018-05-16 RX ORDER — BUDESONIDE AND FORMOTEROL FUMARATE DIHYDRATE 160; 4.5 UG/1; UG/1
2 AEROSOL RESPIRATORY (INHALATION) 2 TIMES DAILY
Qty: 1 INHALER | Refills: 11 | Status: SHIPPED | OUTPATIENT
Start: 2018-05-16 | End: 2018-06-11 | Stop reason: SDUPTHER

## 2018-05-16 NOTE — MR AVS SNAPSHOT
Fanta Ortiz 
 
 
 . [address] [id number] 
[phone] Patient: Tay Sanchez MRN: [id number] FPU:[id number] Visit Information Date & Time Provider Department Dept. Phone Encounter #  
 5/16/2018  9:00 AM Martha Smith [facility] [phone] [id number] Follow-up Instructions Return in about 6 weeks (around 6/27/2018). Your Appointments 6/27/2018 10:30 AM  
Any with Александр Chen MD  
[address] [place] [address] and Primary Care [address] [place] [address]) Appt Note: [address] FOLLOW UP  
 . [address] [facility]. [address] [id number] Upcoming Health Maintenance Date Due Pneumococcal 19-64 Medium Risk (1 of 1 - PPSV23) 5/16/2019* DTaP/Tdap/Td series (1 - Tdap) 5/16/2019* Influenza Age 5 to Adult 8/1/2018 *Topic was postponed. The date shown is not the original due date. Allergies as of 5/16/2018  Review Complete On: 5/16/2018 By: Александр Chen MD  
  
 Severity Noted Reaction Type Reactions Codeine  01/05/2011    Other (comments) Seroquel [Quetiapine]  01/05/2011    Other (comments) tremors Current Immunizations  Reviewed on 1/22/2013 No immunizations on file. Not reviewed this visit You Were Diagnosed With   
  
 Codes Comments COPD with asthma ([facility]    -  Primary ICD-10-CM: J44.9 ICD-9-CM: 493.20 Essential hypertension     ICD-10-CM: I10 
ICD-9-CM: 401.9 Cocaine abuse in remission     ICD-10-CM: F14.11 ICD-9-CM: 305.63 Tobacco abuse     ICD-10-CM: Z72.0 ICD-9-CM: 305.1 PTSD (post-traumatic stress disorder)     ICD-10-CM: F43.10 ICD-9-CM: 309.81 Schizophrenia, unspecified type ([facility]     ICD-10-CM: F20.9 ICD-9-CM: 295.90 Seizures ([facility]     ICD-10-CM: R56.9 ICD-9-CM: 780.39 Vitals BP Pulse Temp Resp Height(growth percentile) Weight(growth percentile) 112/76 (!) 117 98 °F (36.7 °C) (Oral) 20 5' 6\" (1.676 m) 128 lb 11.2 oz (58.4 kg) SpO2 BMI Smoking Status 95% 20.77 kg/m2 Current Every Day Smoker Vitals History BMI and BSA Data Body Mass Index Body Surface Area 20.77 kg/m 2 1.65 m 2 Preferred Pharmacy Pharmacy Name Phone [facility] [address], [address] [place] [address] [phone] Your Updated Medication List  
  
   
This list is accurate as of 5/16/18  9:54 AM.  Always use your most recent med list. amLODIPine 5 mg tablet Commonly known as:  Patricia Steve TK 1 T PO QD  
  
 haloperidol 5 mg tablet Commonly known as:  HALDOL Take 1 Tab by mouth nightly. levETIRAcetam 500 mg tablet Commonly known as:  KEPPRA Take 2 Tabs by mouth two (2) times a day. SYMBICORT 160-4.5 mcg/actuation Hfaa Generic drug:  budesonide-formoterol Take 2 Puffs by inhalation two (2) times a day. * VENTOLIN HFA 90 mcg/actuation inhaler Generic drug:  albuterol INL 2 PFS PO Q 4 H PRN  
  
 * albuterol 90 mcg/actuation inhaler Commonly known as:  PROVENTIL HFA, VENTOLIN HFA, PROAIR HFA Take 2 Puffs by inhalation every four (4) hours as needed for Wheezing. * albuterol 2.5 mg /3 mL (0.083 %) nebulizer solution Commonly known as:  PROVENTIL VENTOLIN  
3 mL by Nebulization route every four (4) hours as needed for Wheezing. * Notice: This list has 3 medication(s) that are the same as other medications prescribed for you. Read the directions carefully, and ask your doctor or other care provider to review them with you. Prescriptions Sent to Pharmacy Refills  
 albuterol (PROVENTIL HFA, VENTOLIN HFA, PROAIR HFA) 90 mcg/actuation inhaler 11 Sig: Take 2 Puffs by inhalation every four (4) hours as needed for Wheezing.   
 Class: Normal  
 Pharmacy: [facility] [address] Ph #: [phone] Route: Inhalation SYMBICORT 160-4.5 mcg/actuation HFAA 11 Sig: Take 2 Puffs by inhalation two (2) times a day. Class: Normal  
 Pharmacy: [address] Ph #: [phone] Route: Inhalation  
 haloperidol (HALDOL) 5 mg tablet 1 Sig: Take 1 Tab by mouth nightly. Class: Normal  
 Pharmacy: [address] Ph #: [phone] Route: Oral  
 levETIRAcetam (KEPPRA) 500 mg tablet 2 Sig: Take 2 Tabs by mouth two (2) times a day. Class: Normal  
 Pharmacy: [address] Ph #: [phone] Route: Oral  
  
We Performed the Following AMB SUPPLY ORDER [[id number] Custom] Comments:  
 Jet neb CBC WITH AUTOMATED DIFF [35339 CPT(R)] COLLECTION VENOUS BLOOD,VENIPUNCTURE [id number] CPT(R)] HEMOGLOBIN A1C WITH EAG [45132 CPT(R)] HEPATITIS PANEL, ACUTE [47117 CPT(R)] HIV 1/2 AG/AB, 4TH GENERATION,W RFLX CONFIRM [id number] CPT(R)] LEVETIRACETAM (KEPPRA) [id number] CPT(R)] LIPID PANEL [47090 CPT(R)] METABOLIC PANEL, COMPREHENSIVE [54839 CPT(R)] REFERRAL TO NEUROLOGY [XWP37 Custom] REFERRAL TO PSYCHIATRY [REF91 Custom] Comments: PLEASE SCHEDULE WITH DR Ender Hartley  
 RPR [22226 CPT(R)] URINALYSIS W/ RFLX MICROSCOPIC [81045 CPT(R)] Follow-up Instructions Return in about 6 weeks (around 6/27/2018). Referral Information Referral ID Referred By Referred To  
  
 [id number] Jason NEGRON Not Available Visits Status Start Date End Date 1 New Request 5/16/18 5/16/19 If your referral has a status of pending review or denied, additional information will be sent to support the outcome of this decision. Referral ID Referred By Referred To  
 [id number] Stacey SALES [address], [address]  [address] [facility] [address], [address] Phone: [phone] Fax: [phone] Visits Status Start Date End Date 1 New Request 5/16/18 5/16/19 If your referral has a status of pending review or denied, additional information will be sent to support the outcome of this decision. Introducing [facility] & HEALTH SERVICES! [facility] introduces Tradesparq patient portal. Now you can access parts of your medical record, email your doctor's office, and request medication refills online. 1. In your internet browser, go to [email or web address] 2. Click on the First Time User? Click Here link in the Sign In box. You will see the New Member Sign Up page. 3. Enter your Tradesparq Access Code exactly as it appears below. You will not need to use this code after youve completed the sign-up process. If you do not sign up before the expiration date, you must request a new code. · Tradesparq Access Code: [email or web address] Expires: 8/14/2018  9:47 AM 
 
4. Enter the last four digits of your Social Security Number (xxxx) and Date of Birth (mm/dd/yyyy) as indicated and click Submit. You will be taken to the next sign-up page. 5. Create a Pinnacle Enginest ID. This will be your Tradesparq login ID and cannot be changed, so think of one that is secure and easy to remember. 6. Create a Tradesparq password. You can change your password at any time. 7. Enter your Password Reset Question and Answer. This can be used at a later time if you forget your password. 8. Enter your e-mail address. You will receive e-mail notification when new information is available in [address] 9. Click Sign Up. You can now view and download portions of your medical record. 10. Click the Download Summary menu link to download a portable copy of your medical information. If you have questions, please visit the Frequently Asked Questions section of the Aventonest website. Remember, mYwindow is NOT to be used for urgent needs. For medical emergencies, dial 911. Now available from your iPhone and Android! Please provide this summary of care documentation to your next provider. Your primary care clinician is listed as Nighat Lindsay. If you have any questions after today's visit, please call [phone].

## 2018-05-16 NOTE — PROGRESS NOTES
1. Have you been to the ER, urgent care clinic since your last visit? Hospitalized since your last visit? Yes When: 5-12-18 Reason for visit: seizure    2. Have you seen or consulted any other health care providers outside of the [address] [place] [address] since your last visit? Include any pap smears or colon screening.  [place]    Requesting a nebulizer machine  Wants to discuss bad nerves  Patient states that he has been drug free for 1 1/2 weeks

## 2018-05-17 LAB
ALBUMIN SERPL-MCNC: 4.6 G/DL (ref 3.5–5.5)
ALBUMIN/GLOB SERPL: 1.8 {RATIO} (ref 1.2–2.2)
ALP SERPL-CCNC: 83 IU/L (ref 39–117)
ALT SERPL-CCNC: 26 IU/L (ref 0–44)
APPEARANCE UR: CLEAR
AST SERPL-CCNC: 24 IU/L (ref 0–40)
BASOPHILS # BLD AUTO: 0 X10E3/UL (ref 0–0.2)
BASOPHILS NFR BLD AUTO: 0 %
BILIRUB SERPL-MCNC: 0.3 MG/DL (ref 0–1.2)
BILIRUB UR QL STRIP: NEGATIVE
BUN SERPL-MCNC: 6 MG/DL (ref 6–20)
BUN/CREAT SERPL: 6 (ref 9–20)
CALCIUM SERPL-MCNC: 9.9 MG/DL (ref 8.7–10.2)
CHLORIDE SERPL-SCNC: 95 MMOL/L (ref 96–106)
CHOLEST SERPL-MCNC: 156 MG/DL (ref 100–199)
CO2 SERPL-SCNC: 24 MMOL/L (ref 18–29)
COLOR UR: YELLOW
CREAT SERPL-MCNC: 0.97 MG/DL (ref 0.76–1.27)
EOSINOPHIL # BLD AUTO: 0.2 X10E3/UL (ref 0–0.4)
EOSINOPHIL NFR BLD AUTO: 3 %
ERYTHROCYTE [DISTWIDTH] IN BLOOD BY AUTOMATED COUNT: 17.3 % (ref 12.3–15.4)
EST. AVERAGE GLUCOSE BLD GHB EST-MCNC: 143 MG/DL
GFR SERPLBLD CREATININE-BSD FMLA CKD-EPI: 113 ML/MIN/1.73
GFR SERPLBLD CREATININE-BSD FMLA CKD-EPI: 98 ML/MIN/1.73
GLOBULIN SER CALC-MCNC: 2.6 G/DL (ref 1.5–4.5)
GLUCOSE SERPL-MCNC: 127 MG/DL (ref 65–99)
GLUCOSE UR QL: NEGATIVE
HAV IGM SERPL QL IA: NEGATIVE
HBA1C MFR BLD: 6.6 % (ref 4.8–5.6)
HBV CORE IGM SERPL QL IA: NEGATIVE
HBV SURFACE AG SERPL QL IA: NEGATIVE
HCT VFR BLD AUTO: 42.3 % (ref 37.5–51)
HCV AB S/CO SERPL IA: <0.1 S/CO RATIO (ref 0–0.9)
HDLC SERPL-MCNC: 65 MG/DL
HGB BLD-MCNC: 13.4 G/DL (ref 13–17.7)
HGB UR QL STRIP: NEGATIVE
HIV 1+2 AB+HIV1 P24 AG SERPL QL IA: NON REACTIVE
IMM GRANULOCYTES # BLD: 0 X10E3/UL (ref 0–0.1)
IMM GRANULOCYTES NFR BLD: 0 %
KETONES UR QL STRIP: NEGATIVE
LDLC SERPL CALC-MCNC: 73 MG/DL (ref 0–99)
LEUKOCYTE ESTERASE UR QL STRIP: NEGATIVE
LYMPHOCYTES # BLD AUTO: 1.7 X10E3/UL (ref 0.7–3.1)
LYMPHOCYTES NFR BLD AUTO: 21 %
MCH RBC QN AUTO: 25.5 PG (ref 26.6–33)
MCHC RBC AUTO-ENTMCNC: 31.7 G/DL (ref 31.5–35.7)
MCV RBC AUTO: 81 FL (ref 79–97)
MICRO URNS: NORMAL
MONOCYTES # BLD AUTO: 0.9 X10E3/UL (ref 0.1–0.9)
MONOCYTES NFR BLD AUTO: 10 %
NEUTROPHILS # BLD AUTO: 5.3 X10E3/UL (ref 1.4–7)
NEUTROPHILS NFR BLD AUTO: 66 %
NITRITE UR QL STRIP: NEGATIVE
PH UR STRIP: 6.5 [PH] (ref 5–7.5)
PLATELET # BLD AUTO: 423 X10E3/UL (ref 150–379)
POTASSIUM SERPL-SCNC: 5.2 MMOL/L (ref 3.5–5.2)
PROT SERPL-MCNC: 7.2 G/DL (ref 6–8.5)
PROT UR QL STRIP: NEGATIVE
RBC # BLD AUTO: 5.25 X10E6/UL (ref 4.14–5.8)
RPR SER QL: NON REACTIVE
SODIUM SERPL-SCNC: 138 MMOL/L (ref 134–144)
SP GR UR: 1.01 (ref 1–1.03)
TRIGL SERPL-MCNC: 88 MG/DL (ref 0–149)
UROBILINOGEN UR STRIP-MCNC: 0.2 MG/DL (ref 0.2–1)
VLDLC SERPL CALC-MCNC: 18 MG/DL (ref 5–40)
WBC # BLD AUTO: 8.1 X10E3/UL (ref 3.4–10.8)

## 2018-05-25 ENCOUNTER — OFFICE VISIT (OUTPATIENT)
Dept: INTERNAL MEDICINE CLINIC | Age: 38
End: 2018-05-25

## 2018-05-25 VITALS
TEMPERATURE: 98 F | DIASTOLIC BLOOD PRESSURE: 74 MMHG | HEIGHT: 66 IN | RESPIRATION RATE: 18 BRPM | BODY MASS INDEX: 20.81 KG/M2 | OXYGEN SATURATION: 97 % | SYSTOLIC BLOOD PRESSURE: 108 MMHG | WEIGHT: 129.5 LBS | HEART RATE: 91 BPM

## 2018-05-25 DIAGNOSIS — F20.9 SCHIZOPHRENIA, UNSPECIFIED TYPE (HCC): ICD-10-CM

## 2018-05-25 DIAGNOSIS — I10 ESSENTIAL HYPERTENSION: ICD-10-CM

## 2018-05-25 DIAGNOSIS — E11.9 TYPE 2 DIABETES MELLITUS WITHOUT COMPLICATION, WITHOUT LONG-TERM CURRENT USE OF INSULIN (HCC): ICD-10-CM

## 2018-05-25 DIAGNOSIS — J44.9 COPD WITH ASTHMA (HCC): Primary | ICD-10-CM

## 2018-05-25 DIAGNOSIS — G89.4 CHRONIC PAIN SYNDROME: ICD-10-CM

## 2018-05-25 LAB — GLUCOSE POC: 136 MG/DL

## 2018-05-25 RX ORDER — METFORMIN HYDROCHLORIDE 500 MG/1
500 TABLET, EXTENDED RELEASE ORAL
Qty: 30 TAB | Refills: 11 | Status: SHIPPED | OUTPATIENT
Start: 2018-05-25 | End: 2018-06-11 | Stop reason: SDUPTHER

## 2018-05-25 RX ORDER — DICLOFENAC SODIUM 75 MG/1
75 TABLET, DELAYED RELEASE ORAL 2 TIMES DAILY
Qty: 60 TAB | Refills: 3 | Status: SHIPPED | OUTPATIENT
Start: 2018-05-25 | End: 2018-06-11 | Stop reason: SDUPTHER

## 2018-05-25 RX ORDER — CHLORPROMAZINE HYDROCHLORIDE 50 MG/1
50 TABLET, FILM COATED ORAL 3 TIMES DAILY
Qty: 90 TAB | Refills: 0 | Status: SHIPPED | OUTPATIENT
Start: 2018-05-25 | End: 2018-06-11 | Stop reason: SDUPTHER

## 2018-05-25 RX ORDER — PRAZOSIN HYDROCHLORIDE 1 MG/1
1 CAPSULE ORAL DAILY
COMMUNITY
Start: 2018-05-22 | End: 2018-06-11 | Stop reason: SDUPTHER

## 2018-05-25 RX ORDER — FLUPHENAZINE HYDROCHLORIDE 10 MG/1
10 TABLET ORAL DAILY
Qty: 60 TAB | Refills: 0 | Status: SHIPPED | OUTPATIENT
Start: 2018-05-25 | End: 2018-06-11 | Stop reason: SDUPTHER

## 2018-05-25 NOTE — PROGRESS NOTES
1. Have you been to the ER, urgent care clinic since your last visit? Hospitalized since your last visit? Yes When: 5/18/2018 Where: [facility] emergency  Reason for visit: fall     2. Have you seen or consulted any other health care providers outside of the [facility] since your last visit? Include any pap smears or colon screening. No     Pt wants to go over his blood sugar .
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
## 2018-05-25 NOTE — MR AVS SNAPSHOT
[facility] 
 
 
 [address] [id number] 
[phone] Patient: Rob Guardado MRN: [id number] Visit Information Date & Time Provider Department Dept. Phone Encounter #  
 5/25/2018  9:15 AM MD [facility] [phone] [id number] Follow-up Instructions Return in about 3 months (around 8/25/2018). Follow-up and Disposition History Your Appointments 5/30/2018  9:00 AM  
New Patient with [facility], DO [facility] [address] [place] [address]) Appt Note: NP, seizures, referred by Dr. Severino Cummings [phone], Roslyn De La Torre from PCP's office scheduled and declined first available appt dsk 05/16/18; NP, seizures, referred by Dr. Severino Cummings [phone], Roslyn De La Torre from PCP's office scheduled and declined first available appt dsk 05/16/18  
 [address] [place] [address] [address] [facility] [address]  
[address]  
  
    
 6/25/2018  1:00 PM  
ESTABLISHED PATIENT with Daron Gunderson NP [facility] ([facility]) Appt Note: NP schizophrenia bipolar Ptsd [address] [facility] [address] [facility] [address]  
[phone]  
  
   
 [address] [facility] [address] [place] [id number]  
  
    
 6/27/2018 10:30 AM  
Any with Jaclyn Barajas MD  
[address] [place] [address] and Primary Care [address] [place] [address]) Appt Note: [address] FOLLOW UP  
 [address] [facility]  
  
   
 [id number] Upcoming Health Maintenance Date Due Pneumococcal 19-64 Medium Risk (1 of 1 - PPSV23) 5/16/2019* DTaP/Tdap/Td series (1 - Tdap) 5/16/2019* Influenza Age 5 to Adult 8/1/2018 *Topic was postponed. The date shown is not the original due date. Allergies as of 5/25/2018  Review Complete On: 5/25/2018 By: Caty Pena MD  
  
 Severity Noted Reaction Type Reactions Codeine  01/05/2011    Other (comments) Seroquel [Quetiapine]  01/05/2011    Other (comments) tremors Current Immunizations  Reviewed on 1/22/2013 No immunizations on file. Not reviewed this visit You Were Diagnosed With   
  
 Codes Comments COPD with asthma ([facility]    -  Primary ICD-10-CM: J44.9 ICD-9-CM: 493.20 Essential hypertension     ICD-10-CM: I10 
ICD-9-CM: 401.9 Chronic pain syndrome     ICD-10-CM: G89.4 ICD-9-CM: 338.4 Schizophrenia, unspecified type ([facility]     ICD-10-CM: F20.9 ICD-9-CM: 295.90 Type 2 diabetes mellitus without complication, without long-term current use of insulin ([facility])     ICD-10-CM: E11.9 ICD-9-CM: 250.00 Vitals BP Pulse Temp Resp Height(growth percentile) Weight(growth percentile) 108/74 91 98 °F (36.7 °C) (Oral) 18 5' 6\" (1.676 m) 129 lb 8 oz (58.7 kg) SpO2 BMI Smoking Status 97% 20.9 kg/m2 Current Every Day Smoker Vitals History BMI and BSA Data Body Mass Index Body Surface Area  
 20.9 kg/m 2 1.65 m 2 Preferred Pharmacy Pharmacy Name Phone [facility] [address], [address] [place] [address] [phone] Your Updated Medication List  
  
   
This list is accurate as of 5/25/18 10:55 AM.  Always use your most recent med list. amLODIPine 5 mg tablet Commonly known as:  Francy Zbigniew TK 1 T PO QD  
  
 chlorproMAZINE 50 mg tablet Commonly known as:  THORAZINE Take 1 Tab by mouth three (3) times daily. diclofenac EC 75 mg EC tablet Commonly known as:  VOLTAREN Take 1 Tab by mouth two (2) times a day. fluPHENAZine 10 mg tablet Commonly known as:  PROLIXIN Take 1 Tab by mouth daily. haloperidol 5 mg tablet Commonly known as:  HALDOL Take 1 Tab by mouth nightly. levETIRAcetam 500 mg tablet Commonly known as:  KEPPRA Take 2 Tabs by mouth two (2) times a day. metFORMIN  mg tablet Commonly known as:  GLUCOPHAGE XR Take 1 Tab by mouth daily (with dinner). MINIPRESS 1 mg capsule Generic drug:  prazosin Take 1 mg by mouth daily. SYMBICORT 160-4.5 mcg/actuation Hfaa Generic drug:  budesonide-formoterol Take 2 Puffs by inhalation two (2) times a day. * VENTOLIN HFA 90 mcg/actuation inhaler Generic drug:  albuterol INL 2 PFS PO Q 4 H PRN  
  
 * albuterol 90 mcg/actuation inhaler Commonly known as:  PROVENTIL HFA, VENTOLIN HFA, PROAIR HFA Take 2 Puffs by inhalation every four (4) hours as needed for Wheezing. * albuterol 2.5 mg /3 mL (0.083 %) nebulizer solution Commonly known as:  PROVENTIL VENTOLIN  
3 mL by Nebulization route every four (4) hours as needed for Wheezing. * Notice: This list has 3 medication(s) that are the same as other medications prescribed for you. Read the directions carefully, and ask your doctor or other care provider to review them with you. Prescriptions Sent to Pharmacy Refills  
 fluPHENAZine (PROLIXIN) 10 mg tablet 0 Sig: Take 1 Tab by mouth daily. Class: Normal  
 Pharmacy: [address] Ph #: [phone] Route: Oral  
 chlorproMAZINE (THORAZINE) 50 mg tablet 0 Sig: Take 1 Tab by mouth three (3) times daily. Class: Normal  
 Pharmacy: [address] Ph #: [phone] Route: Oral  
 diclofenac EC (VOLTAREN) 75 mg EC tablet 3 Sig: Take 1 Tab by mouth two (2) times a day. Class: Normal  
 Pharmacy: [address] Ph #: [phone] Route: Oral  
 metFORMIN ER (GLUCOPHAGE XR) 500 mg tablet 11 Sig: Take 1 Tab by mouth daily (with dinner). Class: Normal  
 Pharmacy: [facility] [address], [address] [place] [address] #: [phone] Route: Oral  
  
We Performed the Following REFERRAL TO [facility] [place] [facility] [id number] Custom] Follow-up Instructions Return in about 3 months (around 8/25/2018). Referral Information Referral ID Referred By Referred To  
  
 [id number] Osmar Archer E Not Available Visits Status Start Date End Date 1 New Request 5/25/18 5/25/19 If your referral has a status of pending review or denied, additional information will be sent to support the outcome of this decision. Introducing [facility]! Perla Caceres introduces TUBE patient portal. Now you can access parts of your medical record, email your doctor's office, and request medication refills online. 1. In your internet browser, go to [email or web address] 2. Click on the First Time User? Click Here link in the Sign In box. You will see the New Member Sign Up page. 3. Enter your TUBE Access Code exactly as it appears below. You will not need to use this code after youve completed the sign-up process. If you do not sign up before the expiration date, you must request a new code. · TUBE Access Code: [email or web address] Expires: 8/14/2018  9:47 AM 
 
4. Enter the last four digits of your Social Security Number (xxxx) and Date of Birth (mm/dd/yyyy) as indicated and click Submit. You will be taken to the next sign-up page. 5. Create a vzaart ID. This will be your TUBE login ID and cannot be changed, so think of one that is secure and easy to remember. 6. Create a vzaart password. You can change your password at any time. 7. Enter your Password Reset Question and Answer. This can be used at a later time if you forget your password. 8. Enter your e-mail address. You will receive e-mail notification when new information is available in [address] 9. Click Sign Up. You can now view and download portions of your medical record. 10. Click the Download Summary menu link to download a portable copy of your medical information. If you have questions, please visit the Frequently Asked Questions section of the [employer] website. Remember, [employer] is NOT to be used for urgent needs. For medical emergencies, dial 911. Now available from your iPhone and Android! Please provide this summary of care documentation to your next provider. Your primary care clinician is listed as Andrews Navas. If you have any questions after today's visit, please call [phone].

## 2018-05-30 ENCOUNTER — OFFICE VISIT (OUTPATIENT)
Dept: NEUROLOGY | Age: 38
End: 2018-05-30

## 2018-05-30 VITALS
SYSTOLIC BLOOD PRESSURE: 128 MMHG | BODY MASS INDEX: 21.31 KG/M2 | DIASTOLIC BLOOD PRESSURE: 96 MMHG | WEIGHT: 132 LBS | OXYGEN SATURATION: 98 % | HEART RATE: 116 BPM | RESPIRATION RATE: 18 BRPM

## 2018-05-30 DIAGNOSIS — F19.11 HISTORY OF SUBSTANCE ABUSE (HCC): ICD-10-CM

## 2018-05-30 DIAGNOSIS — R25.1 TREMULOUSNESS: ICD-10-CM

## 2018-05-30 DIAGNOSIS — R56.9 SEIZURES (HCC): Primary | ICD-10-CM

## 2018-05-30 NOTE — MR AVS SNAPSHOT
[facility] [address] [place] [address] 
[phone] Patient: Enedina Jacobson MRN: [id number] BET:0/92/3051 Visit Information Date & Time Provider Department Dept. Phone Encounter #  
 5/30/2018  9:00 AM DO [facility] at [address] [id number] Follow-up Instructions Return in about 2 months (around 7/30/2018). Routing History Your Appointments 6/25/2018  1:00 PM  
ESTABLISHED PATIENT with Enrique Brown NP [facility] ([facility]) Appt Note: NP schizophrenia bipolar Ptsd [address] [facility] [address] [address]  
[phone]  
  
   
 [address] [place] [address]  
  
    
 6/27/2018 10:30 AM  
Any with Sukh Julien MD  
[address] [place] [address] and [facility] Appt Note: Jassi 605 FOLLOW UP  
 [address] [facility]  
  
   
 [address] [id number] Upcoming Health Maintenance Date Due  
 FOOT EXAM Q1 9/16/1988 MICROALBUMIN Q1 9/16/1988 EYE EXAM RETINAL OR DILATED Q1 9/16/1988 Pneumococcal 19-64 Medium Risk (1 of 1 - PPSV23) 5/16/2019* DTaP/Tdap/Td series (1 - Tdap) 5/16/2019* Influenza Age 5 to Adult 8/1/2018 HEMOGLOBIN A1C Q6M 11/16/2018 LIPID PANEL Q1 5/16/2019 *Topic was postponed. The date shown is not the original due date. Allergies as of 5/30/2018  Review Complete On: 5/30/2018 By: Devendra Littlejohn LPN Severity Noted Reaction Type Reactions Codeine  01/05/2011    Other (comments) Seroquel [Quetiapine]  01/05/2011    Other (comments) tremors Current Immunizations  Reviewed on 1/22/2013 No immunizations on file. Not reviewed this visit You Were Diagnosed With   
  
 Codes Comments Seizures ([facility] [address]    -  Primary ICD-10-CM: R56.9 ICD-9-CM: 780.39   
 Tremulousness     ICD-10-CM: R25.1 ICD-9-CM: 781.0 History of substance abuse     ICD-10-CM: Z87.898 ICD-9-CM: V13.89 Vitals BP Pulse Resp Weight(growth percentile) SpO2 BMI  
 (!) 128/96 (!) 116 18 132 lb (59.9 kg) 98% 21.31 kg/m2 Smoking Status Current Every Day Smoker BMI and BSA Data Body Mass Index Body Surface Area  
 21.31 kg/m 2 1.67 m 2 Preferred Pharmacy Pharmacy Name Phone Shalom Brandt [address], [address] [facility] [phone] Your Updated Medication List  
  
   
This list is accurate as of 5/30/18  9:17 AM.  Always use your most recent med list. amLODIPine 5 mg tablet Commonly known as:  Chery Shield TK 1 T PO QD  
  
 chlorproMAZINE 50 mg tablet Commonly known as:  THORAZINE Take 1 Tab by mouth three (3) times daily. diclofenac EC 75 mg EC tablet Commonly known as:  VOLTAREN Take 1 Tab by mouth two (2) times a day. fluPHENAZine 10 mg tablet Commonly known as:  PROLIXIN Take 1 Tab by mouth daily. haloperidol 5 mg tablet Commonly known as:  HALDOL Take 1 Tab by mouth nightly. HYDROXYZINE HCL PO Take  by mouth.  
  
 levETIRAcetam 500 mg tablet Commonly known as:  KEPPRA Take 2 Tabs by mouth two (2) times a day. metFORMIN  mg tablet Commonly known as:  GLUCOPHAGE XR Take 1 Tab by mouth daily (with dinner). MINIPRESS 1 mg capsule Generic drug:  prazosin Take 1 mg by mouth daily. SYMBICORT 160-4.5 mcg/actuation Hfaa Generic drug:  budesonide-formoterol Take 2 Puffs by inhalation two (2) times a day. * VENTOLIN HFA 90 mcg/actuation inhaler Generic drug:  albuterol INL 2 PFS PO Q 4 H PRN  
  
 * albuterol 90 mcg/actuation inhaler Commonly known as:  PROVENTIL HFA, VENTOLIN HFA, PROAIR HFA Take 2 Puffs by inhalation every four (4) hours as needed for Wheezing. * albuterol 2.5 mg /3 mL (0.083 %) nebulizer solution Commonly known as:  PROVENTIL VENTOLIN  
3 mL by Nebulization route every four (4) hours as needed for Wheezing. * Notice: This list has 3 medication(s) that are the same as other medications prescribed for you. Read the directions carefully, and ask your doctor or other care provider to review them with you. Follow-up Instructions Return in about 2 months (around 7/30/2018). To-Do List   
 05/30/2018 Neurology:  EEG   
  
 05/30/2018 Imaging:  MRI BRAIN W WO CONT   
  
 06/08/2018 9:00 AM  
  Appointment with DIABETES CLASS #1 [facility] at [facility] ([phone]) Patient Instructions A Healthy Lifestyle: Care Instructions Your Care Instructions A healthy lifestyle can help you feel good, stay at a healthy weight, and have plenty of energy for both work and play. A healthy lifestyle is something you can share with your whole family. A healthy lifestyle also can lower your risk for serious health problems, such as high blood pressure, heart disease, and diabetes. You can follow a few steps listed below to improve your health and the health of your family. Follow-up care is a key part of your treatment and safety. Be sure to make and go to all appointments, and call your doctor if you are having problems. It's also a good idea to know your test results and keep a list of the medicines you take. How can you care for yourself at home? · Do not eat too much sugar, fat, or fast foods. You can still have dessert and treats now and then. The goal is moderation. · Start small to improve your eating habits. Pay attention to portion sizes, drink less juice and soda pop, and eat more fruits and vegetables. ¨ Eat a healthy amount of food. A 3-ounce serving of meat, for example, is about the size of a deck of cards. Fill the rest of your plate with vegetables and whole grains. ¨ Limit the amount of soda and sports drinks you have every day. Drink more water when you are thirsty. ¨ Eat at least 5 servings of fruits and vegetables every day. It may seem like a lot, but it is not hard to reach this goal. A serving or helping is 1 piece of fruit, 1 cup of vegetables, or 2 cups of leafy, raw vegetables. Have an apple or some carrot sticks as an afternoon snack instead of a candy bar. Try to have fruits and/or vegetables at every meal. 
· Make exercise part of your daily routine. You may want to start with simple activities, such as walking, bicycling, or slow swimming. Try to be active 30 to 60 minutes every day. You do not need to do all 30 to 60 minutes all at once. For example, you can exercise 3 times a day for 10 or 20 minutes. Moderate exercise is safe for most people, but it is always a good idea to talk to your doctor before starting an exercise program. 
· Keep moving. Resendez Boys the lawn, work in the garden, or fishfishme. Take the stairs instead of the elevator at work. · If you smoke, quit. People who smoke have an increased risk for heart attack, stroke, cancer, and other lung illnesses. Quitting is hard, but there are ways to boost your chance of quitting tobacco for good. ¨ Use nicotine gum, patches, or lozenges. ¨ Ask your doctor about stop-smoking programs and medicines. ¨ Keep trying. In addition to reducing your risk of diseases in the future, you will notice some benefits soon after you stop using tobacco. If you have shortness of breath or asthma symptoms, they will likely get better within a few weeks after you quit. · Limit how much alcohol you drink. Moderate amounts of alcohol (up to 2 drinks a day for men, 1 drink a day for women) are okay. But drinking too much can lead to liver problems, high blood pressure, and other health problems. Family health If you have a family, there are many things you can do together to improve your health. · Eat meals together as a family as often as possible. · Eat healthy foods. This includes fruits, vegetables, lean meats and dairy, and whole grains. · Include your family in your fitness plan. Most people think of activities such as jogging or tennis as the way to fitness, but there are many ways you and your family can be more active. Anything that makes you breathe hard and gets your heart pumping is exercise. Here are some tips: 
¨ Walk to do errands or to take your child to school or the bus. ¨ Go for a family bike ride after dinner instead of watching TV. Where can you learn more? Go to http://barrettVictriodawson.info/. Enter Z716 in the search box to learn more about \"A Healthy Lifestyle: Care Instructions. \" Current as of: May 12, 2017 Content Version: 11.4 © 0350-0569 [employer]ackingPoint. Care instructions adapted under license by [employer] (which disclaims liability or warranty for this information). If you have questions about a medical condition or this instruction, always ask your healthcare professional. [address] any warranty or liability for your use of this information. Electroencephalogram (EEG): About This Test 
What is it? An electroencephalogram (EEG) lets a doctor see the electrical activity of your brain. You will have small pads or patches attached to different places on your head. These are called electrodes. Wires connect the electrodes to a computer. The computer records the activity of the brain. This looks like wavy lines on the computer screen or on paper. Why is this test done? The test is often used to diagnose epilepsy. It helps a doctor know what types of seizures are happening. An EEG can also check brain activity in people with sleep disorders. It can also help a doctor know why a person passed out (lost consciousness).  
How can you prepare for the test? 
 · Tell your doctor if you are taking any medicines. Your doctor may ask you to stop taking certain medicines before the test. These include sedatives and tranquilizers, muscle relaxants, sleeping aids, and seizure medicines. · Do not eat or drink anything with caffeine in it for 12 hours before the test. This includes cola, energy drinks, and chocolate. · Shampoo your hair and rinse with clear water the evening before or the morning of the test. Do not put any hair conditioner or oil on after you wash your hair. · Your doctor may ask you not to sleep the night before the test or to sleep for only about 4 or 5 hours. This is because some types of brain activity can only be seen if you are asleep. If your doctor asks you to get less sleep than normal, plan to have someone drive you to and from the test. 
What happens during the test? 
· You will lie on your back on a bed or table. Or you might relax in a chair with your eyes closed. · A technologist will attach the electrodes to different places on your head. Or you might get a cap with fixed electrodes on it. · You will lie still with your eyes closed. The technologist will tell you not to talk unless you need to. · The technologist may ask you to: ¨ Breathe deeply and rapidly. This is called hyperventilating. ¨ Look at a bright, flashing light called a strobe. ¨ Go to sleep. If you can't fall asleep, you may get medicine to help you. What else should you know about the test? 
· There is no pain. No electrical current goes through your body. · If you have a seizure disorder such as epilepsy, the flashing lights or hyperventilation may cause a seizure. The technologist is trained to take care of you if this happens. · If electrodes are put in your nose, they may tickle. In rare cases, they can also cause some soreness or a small amount of bleeding for 1 to 2 days after the test. 
How long does the test take? · The test will take about 1 to 2 hours. What happens after the test? 
· You will probably be able to go home right away. But if you didn't sleep your normal amount before the test, have someone drive you home. · You can go back to your usual activities right away. When should you call for help? Watch closely for changes in your health, and be sure to contact your doctor if: 
· You have any problems that you think may be from the test. 
· You have any questions about the test or have not received your results. Follow-up care is a key part of your treatment and safety. Be sure to make and go to all appointments, and call your doctor if you are having problems. It's also a good idea to keep a list of the medicines you take. Ask your doctor when you can expect to have your test results. Where can you learn more? Go to http://barrett-dawson.info/. Enter F196 in the search box to learn more about \"Electroencephalogram (EEG): About This Test.\" Current as of: October 14, 2016 Content Version: 11.4 © 1191-5299 Linea. Care instructions adapted under license by [employer] (which disclaims liability or warranty for this information). If you have questions about a medical condition or this instruction, always ask your healthcare professional. [address] any warranty or liability for your use of this information. Introducing [facility]! Buddy Fleming introduces [employer] patient portal. Now you can access parts of your medical record, email your doctor's office, and request medication refills online. 1. In your internet browser, go to [email or web address] [email or web address] 2. Click on the First Time User? Click Here link in the Sign In box. You will see the New Member Sign Up page. 3. Enter your [employer] Access Code exactly as it appears below. You will not need to use this code after youve completed the sign-up process.  If you do not sign up before the expiration date, you must request a new code. · SurgiLight Access Code: [email or web address] Expires: 8/14/2018  9:47 AM 
 
4. Enter the last four digits of your Social Security Number (xxxx) and Date of Birth (mm/dd/yyyy) as indicated and click Submit. You will be taken to the next sign-up page. 5. Create a SurgiLight ID. This will be your SurgiLight login ID and cannot be changed, so think of one that is secure and easy to remember. 6. Create a S[employer]iLight password. You can change your password at any time. 7. Enter your Password Reset Question and Answer. This can be used at a later time if you forget your password. 8. Enter your e-mail address. You will receive e-mail notification when new information is available in [address] 9. Click Sign Up. You can now view and download portions of your medical record. 10. Click the Download Summary menu link to download a portable copy of your medical information. If you have questions, please visit the Frequently Asked Questions section of the SurgiLight website. Remember, SurgiLight is NOT to be used for urgent needs. For medical emergencies, dial 911. Now available from your iPhone and Android! Please provide this summary of care documentation to your next provider. Your primary care clinician is listed as Juana Tristan. If you have any questions after today's visit, please call [phone].
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
## 2018-05-30 NOTE — PATIENT INSTRUCTIONS
A Healthy Lifestyle: Care Instructions  Your Care Instructions    A healthy lifestyle can help you feel good, stay at a healthy weight, and have plenty of energy for both work and play. A healthy lifestyle is something you can share with your whole family. A healthy lifestyle also can lower your risk for serious health problems, such as high blood pressure, heart disease, and diabetes. You can follow a few steps listed below to improve your health and the health of your family. Follow-up care is a key part of your treatment and safety. Be sure to make and go to all appointments, and call your doctor if you are having problems. It's also a good idea to know your test results and keep a list of the medicines you take. How can you care for yourself at home? · Do not eat too much sugar, fat, or fast foods. You can still have dessert and treats now and then. The goal is moderation. · Start small to improve your eating habits. Pay attention to portion sizes, drink less juice and soda pop, and eat more fruits and vegetables. ¨ Eat a healthy amount of food. A 3-ounce serving of meat, for example, is about the size of a deck of cards. Fill the rest of your plate with vegetables and whole grains. ¨ Limit the amount of soda and sports drinks you have every day. Drink more water when you are thirsty. ¨ Eat at least 5 servings of fruits and vegetables every day. It may seem like a lot, but it is not hard to reach this goal. A serving or helping is 1 piece of fruit, 1 cup of vegetables, or 2 cups of leafy, raw vegetables. Have an apple or some carrot sticks as an afternoon snack instead of a candy bar. Try to have fruits and/or vegetables at every meal.  · Make exercise part of your daily routine. You may want to start with simple activities, such as walking, bicycling, or slow swimming. Try to be active 30 to 60 minutes every day. You do not need to do all 30 to 60 minutes all at once.  For example, you can exercise 3 times a day for 10 or 20 minutes. Moderate exercise is safe for most people, but it is always a good idea to talk to your doctor before starting an exercise program.  · Keep moving. Ismael Goodwin the lawn, work in the garden, or [employer]. Take the stairs instead of the elevator at work. · If you smoke, quit. People who smoke have an increased risk for heart attack, stroke, cancer, and other lung illnesses. Quitting is hard, but there are ways to boost your chance of quitting tobacco for good. ¨ Use nicotine gum, patches, or lozenges. ¨ Ask your doctor about stop-smoking programs and medicines. ¨ Keep trying. In addition to reducing your risk of diseases in the future, you will notice some benefits soon after you stop using tobacco. If you have shortness of breath or asthma symptoms, they will likely get better within a few weeks after you quit. · Limit how much alcohol you drink. Moderate amounts of alcohol (up to 2 drinks a day for men, 1 drink a day for women) are okay. But drinking too much can lead to liver problems, high blood pressure, and other health problems. Family health  If you have a family, there are many things you can do together to improve your health. · Eat meals together as a family as often as possible. · Eat healthy foods. This includes fruits, vegetables, lean meats and dairy, and whole grains. · Include your family in your fitness plan. Most people think of activities such as jogging or tennis as the way to fitness, but there are many ways you and your family can be more active. Anything that makes you breathe hard and gets your heart pumping is exercise. Here are some tips:  ¨ Walk to do errands or to take your child to school or the bus. ¨ Go for a family bike ride after dinner instead of watching TV. Where can you learn more? Go to http://barrett-dawson.info/. Enter J176 in the search box to learn more about \"A Healthy Lifestyle: Care Instructions. \"  Current as of: May 12, 2017  Content Version: 11.4  © 2891-3456 [employer]. Care instructions adapted under license by [employer] (which disclaims liability or warranty for this information). If you have questions about a medical condition or this instruction, always ask your healthcare professional. [address] any warranty or liability for your use of this information. Electroencephalogram (EEG): About This Test  What is it? An electroencephalogram (EEG) lets a doctor see the electrical activity of your brain. You will have small pads or patches attached to different places on your head. These are called electrodes. Wires connect the electrodes to a computer. The computer records the activity of the brain. This looks like wavy lines on the computer screen or on paper. Why is this test done? The test is often used to diagnose epilepsy. It helps a doctor know what types of seizures are happening. An EEG can also check brain activity in people with sleep disorders. It can also help a doctor know why a person passed out (lost consciousness). How can you prepare for the test?  · Tell your doctor if you are taking any medicines. Your doctor may ask you to stop taking certain medicines before the test. These include sedatives and tranquilizers, muscle relaxants, sleeping aids, and seizure medicines. · Do not eat or drink anything with caffeine in it for 12 hours before the test. This includes cola, energy drinks, and chocolate. · Shampoo your hair and rinse with clear water the evening before or the morning of the test. Do not put any hair conditioner or oil on after you wash your hair. · Your doctor may ask you not to sleep the night before the test or to sleep for only about 4 or 5 hours. This is because some types of brain activity can only be seen if you are asleep.  If your doctor asks you to get less sleep than normal, plan to have someone drive you to and from the test.  What happens during the test?  · You will lie on your back on a bed or table. Or you might relax in a chair with your eyes closed. · A technologist will attach the electrodes to different places on your head. Or you might get a cap with fixed electrodes on it. · You will lie still with your eyes closed. The technologist will tell you not to talk unless you need to. · The technologist may ask you to:  ¨ Breathe deeply and rapidly. This is called hyperventilating. ¨ Look at a bright, flashing light called a strobe. ¨ Go to sleep. If you can't fall asleep, you may get medicine to help you. What else should you know about the test?  · There is no pain. No electrical current goes through your body. · If you have a seizure disorder such as epilepsy, the flashing lights or hyperventilation may cause a seizure. The technologist is trained to take care of you if this happens. · If electrodes are put in your nose, they may tickle. In rare cases, they can also cause some soreness or a small amount of bleeding for 1 to 2 days after the test.  How long does the test take? · The test will take about 1 to 2 hours. What happens after the test?  · You will probably be able to go home right away. But if you didn't sleep your normal amount before the test, have someone drive you home. · You can go back to your usual activities right away. When should you call for help? Watch closely for changes in your health, and be sure to contact your doctor if:  · You have any problems that you think may be from the test.  · You have any questions about the test or have not received your results. Follow-up care is a key part of your treatment and safety. Be sure to make and go to all appointments, and call your doctor if you are having problems. It's also a good idea to keep a list of the medicines you take. Ask your doctor when you can expect to have your test results. Where can you learn more?   Go to http://barrett-dawson.info/. Enter F196 in the search box to learn more about \"Electroencephalogram (EEG): About This Test.\"  Current as of: October 14, 2016  Content Version: 11.4  © 8014-0536 Healthwise, [facility]. Care instructions adapted under license by [employer] (which disclaims liability or warranty for this information). If you have questions about a medical condition or this instruction, always ask your healthcare professional. [address] any warranty or liability for your use of this information.

## 2018-06-07 ENCOUNTER — HOSPITAL ENCOUNTER (OUTPATIENT)
Dept: MRI IMAGING | Age: 38
Discharge: HOME | End: 2018-06-07
Attending: PSYCHIATRY & NEUROLOGY
Payer: MEDICAID

## 2018-06-07 DIAGNOSIS — R56.9 SEIZURES (HCC): ICD-10-CM

## 2018-06-07 PROCEDURE — A9575 INJ GADOTERATE MEGLUMI 0.1ML: HCPCS | Performed by: PSYCHIATRY & NEUROLOGY

## 2018-06-07 PROCEDURE — 70553 MRI BRAIN STEM W/O & W/DYE: CPT

## 2018-06-07 PROCEDURE — 74011250636 HC RX REV CODE- 250/636: Performed by: PSYCHIATRY & NEUROLOGY

## 2018-06-07 RX ORDER — GADOTERATE MEGLUMINE 376.9 MG/ML
12 INJECTION INTRAVENOUS
Status: COMPLETED | OUTPATIENT
Start: 2018-06-07 | End: 2018-06-07

## 2018-06-07 RX ADMIN — GADOTERATE MEGLUMINE 12 ML: 376.9 INJECTION INTRAVENOUS at 13:58

## 2018-06-11 RX ORDER — FLUPHENAZINE HYDROCHLORIDE 10 MG/1
10 TABLET ORAL DAILY
Qty: 30 TAB | Refills: 11 | Status: SHIPPED | OUTPATIENT
Start: 2018-06-11 | End: 2018-07-13

## 2018-06-11 RX ORDER — LEVETIRACETAM 500 MG/1
1000 TABLET ORAL 2 TIMES DAILY
Qty: 60 TAB | Refills: 2 | Status: SHIPPED | OUTPATIENT
Start: 2018-06-11 | End: 2018-08-13

## 2018-06-11 RX ORDER — DICLOFENAC SODIUM 75 MG/1
75 TABLET, DELAYED RELEASE ORAL 2 TIMES DAILY
Qty: 60 TAB | Refills: 3 | Status: SHIPPED | OUTPATIENT
Start: 2018-06-11 | End: 2018-06-27

## 2018-06-11 RX ORDER — BUDESONIDE AND FORMOTEROL FUMARATE DIHYDRATE 160; 4.5 UG/1; UG/1
2 AEROSOL RESPIRATORY (INHALATION) 2 TIMES DAILY
Qty: 1 INHALER | Refills: 11 | Status: SHIPPED | OUTPATIENT
Start: 2018-06-11

## 2018-06-11 RX ORDER — CHLORPROMAZINE HYDROCHLORIDE 50 MG/1
50 TABLET, FILM COATED ORAL 3 TIMES DAILY
Qty: 90 TAB | Refills: 0 | Status: SHIPPED | OUTPATIENT
Start: 2018-06-11 | End: 2018-06-25

## 2018-06-11 RX ORDER — ALBUTEROL SULFATE 90 UG/1
2 AEROSOL, METERED RESPIRATORY (INHALATION)
Qty: 1 INHALER | Refills: 11 | Status: SHIPPED | OUTPATIENT
Start: 2018-06-11 | End: 2018-06-27 | Stop reason: SDUPTHER

## 2018-06-11 RX ORDER — ALBUTEROL SULFATE 0.83 MG/ML
2.5 SOLUTION RESPIRATORY (INHALATION)
Qty: 100 EACH | Refills: 11 | Status: SHIPPED | OUTPATIENT
Start: 2018-06-11

## 2018-06-11 RX ORDER — PRAZOSIN HYDROCHLORIDE 1 MG/1
1 CAPSULE ORAL DAILY
Qty: 30 CAP | Refills: 11 | Status: SHIPPED | OUTPATIENT
Start: 2018-06-11 | End: 2018-06-25 | Stop reason: DRUGHIGH

## 2018-06-11 RX ORDER — METFORMIN HYDROCHLORIDE 500 MG/1
500 TABLET, EXTENDED RELEASE ORAL
Qty: 30 TAB | Refills: 11 | Status: SHIPPED | OUTPATIENT
Start: 2018-06-11 | End: 2019-01-01 | Stop reason: SDUPTHER

## 2018-06-11 RX ORDER — AMLODIPINE BESYLATE 5 MG/1
TABLET ORAL
Qty: 30 TAB | Refills: 11 | Status: SHIPPED | OUTPATIENT
Start: 2018-06-11 | End: 2019-01-01 | Stop reason: SDUPTHER

## 2018-06-11 RX ORDER — HALOPERIDOL 5 MG/1
5 TABLET ORAL
Qty: 30 TAB | Refills: 1 | Status: SHIPPED | OUTPATIENT
Start: 2018-06-11 | End: 2018-06-25 | Stop reason: SDUPTHER

## 2018-06-15 ENCOUNTER — TELEPHONE (OUTPATIENT)
Dept: NEUROLOGY | Age: 38
End: 2018-06-15

## 2018-06-15 NOTE — TELEPHONE ENCOUNTER
Pt was getting an MRI done and someone told him he had additional tests that were ordered. Please call back for clarification.

## 2018-06-19 ENCOUNTER — TELEPHONE (OUTPATIENT)
Dept: NEUROLOGY | Age: 38
End: 2018-06-19

## 2018-06-19 NOTE — TELEPHONE ENCOUNTER
I tried calling the number back attached to this message, and it gave the name of a female. Did not leave message. Will wait for the pt to call back.

## 2018-06-19 NOTE — TELEPHONE ENCOUNTER
----- Message from Romero Garcia sent at 6/19/2018  3:59 PM EDT -----  Regarding: Dr Aida Clarke [phone], pt's  ,Patient was returning the nurses call.

## 2018-06-20 NOTE — TELEPHONE ENCOUNTER
Called back. LM on 's VM that the only test I see we ordered is the MRI. Advised to call back if any further questions.

## 2018-06-25 ENCOUNTER — OFFICE VISIT (OUTPATIENT)
Dept: BEHAVIORAL/MENTAL HEALTH CLINIC | Age: 38
End: 2018-06-25

## 2018-06-25 VITALS
WEIGHT: 125 LBS | HEIGHT: 66 IN | SYSTOLIC BLOOD PRESSURE: 157 MMHG | HEART RATE: 104 BPM | DIASTOLIC BLOOD PRESSURE: 117 MMHG | BODY MASS INDEX: 20.09 KG/M2

## 2018-06-25 DIAGNOSIS — F43.10 PTSD (POST-TRAUMATIC STRESS DISORDER): ICD-10-CM

## 2018-06-25 DIAGNOSIS — F19.959 SUBSTANCE-INDUCED PSYCHOTIC DISORDER (HCC): Primary | ICD-10-CM

## 2018-06-25 DIAGNOSIS — F19.980 SUBSTANCE-INDUCED ANXIETY DISORDER (HCC): ICD-10-CM

## 2018-06-25 DIAGNOSIS — F33.3 MDD (MAJOR DEPRESSIVE DISORDER), RECURRENT, SEVERE, WITH PSYCHOSIS (HCC): ICD-10-CM

## 2018-06-25 DIAGNOSIS — G47.00 INSOMNIA, UNSPECIFIED TYPE: ICD-10-CM

## 2018-06-25 DIAGNOSIS — F41.9 ANXIETY: ICD-10-CM

## 2018-06-25 RX ORDER — TRAZODONE HYDROCHLORIDE 100 MG/1
TABLET ORAL
Qty: 60 TAB | Refills: 0 | Status: SHIPPED | OUTPATIENT
Start: 2018-06-25 | End: 2018-07-13 | Stop reason: SDUPTHER

## 2018-06-25 RX ORDER — PRAZOSIN HYDROCHLORIDE 2 MG/1
2 CAPSULE ORAL
Qty: 30 CAP | Refills: 0 | Status: SHIPPED | OUTPATIENT
Start: 2018-06-25 | End: 2018-07-13 | Stop reason: SDUPTHER

## 2018-06-25 RX ORDER — MIRTAZAPINE 15 MG/1
TABLET, FILM COATED ORAL
Qty: 30 TAB | Refills: 0 | Status: SHIPPED | OUTPATIENT
Start: 2018-06-25 | End: 2018-07-13 | Stop reason: SINTOL

## 2018-06-25 RX ORDER — HYDROXYZINE 50 MG/1
50 TABLET, FILM COATED ORAL
Qty: 90 TAB | Refills: 0 | Status: SHIPPED | OUTPATIENT
Start: 2018-06-25 | End: 2018-07-05

## 2018-06-25 RX ORDER — HALOPERIDOL 10 MG/1
TABLET ORAL
Qty: 30 TAB | Refills: 0 | Status: SHIPPED | OUTPATIENT
Start: 2018-06-25 | End: 2018-07-13 | Stop reason: ALTCHOICE

## 2018-06-25 NOTE — PATIENT INSTRUCTIONS
Sleep Tips    What to avoid    · Do not have drinks with caffeine, such as coffee or black tea, for 8 hours before bed. · Do not smoke or use other types of tobacco near bedtime. Nicotine is a stimulant and can keep you awake. · Avoid drinking alcohol late in the evening, because it can cause you to wake in the middle of the night. · Do not eat a big meal close to bedtime. If you are hungry, eat a light snack. · Do not drink a lot of water close to bedtime, because the need to urinate may wake you up during the night. · Do not read or watch TV in bed. Use the bed only for sleeping and sexual activity. What to try    · Go to bed at the same time every night, and wake up at the same time every morning. Do not take naps during the day. · Keep your bedroom quiet, dark, and cool. · Get regular exercise, but not within 3 to 4 hours of your bedtime. · Sleep on a comfortable pillow and mattress. · If watching the clock makes you anxious, turn it facing away from you so you cannot see the time. · If you worry when you lie down, start a worry book. Well before bedtime, write down your worries, and then set the book and your concerns aside. · Try meditation or other relaxation techniques before you go to bed. · If you cannot fall asleep, get up and go to another room until you feel sleepy. Do something relaxing. Repeat your bedtime routine before you go to bed again. Make your house quiet and calm about an hour before bedtime. Turn down the lights, turn off the TV, log off the computer, and turn down the volume on music. This can help you relax after a busy day. Learning About Drug Misuse  What is drug misuse? Drug misuse means using drugs in a way that harms you or causes you to harm others. Your doctor may call it substance use disorder or drug abuse. It's possible to misuse almost any type of drug, including illegal drugs, prescription drugs, and over-the-counter drugs.  An overdose happens when a person takes more than the normal or recommended amount of a drug. An overdose can result in harmful symptoms or death. Could you have a problem? If there's a chance you may be misusing drugs, it's important to find out. So ask yourself a few questions. Do you spend a lot of time thinking about your medicine or other drug-getting it and using it? Has that taken the place of other things you used to enjoy? Have you had problems with your family or friends, or at work, because of your use? Do you use drugs even when you've told yourself you won't? Do you think you might have a problem? If you do, then you've just taken an important first step. Many people have overcome this problem. And most of them started by reaching out to others, like caring friends or family, their doctor, or a support group. How is drug misuse treated? If you think you may have a problem with drugs, talk to your doctor. You and your doctor can decide whether you have a problem and what type of treatment might help you. You may need to stay in a hospital at first, so that you can be treated for withdrawal symptoms. One of the goals of treatment is helping you get used to life without the drug. Counseling can help you prepare for people or situations that might tempt you to start using again. You can practice these skills through one-on-one counseling, family therapy, or group therapy. Therapy may be part of inpatient treatment, where you stay in a treatment center. Or it may be part of outpatient treatment, where you can fit your therapy around your job or other responsibilities. Another goal of treatment is getting ongoing support for your drug-free life. Many people find support by going to meetings like Narcotics Anonymous or SMART Recovery. This type of support can help you feel less alone and more motivated to stay drug-free. You might talk to your doctor or do an online search for local treatment programs.  Or you might tell a friend or loved one that you need help. Follow-up care is a key part of your treatment and safety. Be sure to make and go to all appointments, and call your doctor if you are having problems. It's also a good idea to know your test results and keep a list of the medicines you take. Where can you learn more? Go to http://barrett-dawson.info/. Enter [phone] in the search box to learn more about \"Learning About Drug Misuse. \"  Current as of: November 3, 2016  Content Version: 11.4  © 2753-6515 [employer]. Care instructions adapted under license by [employer] (which disclaims liability or warranty for this information). If you have questions about a medical condition or this instruction, always ask your healthcare professional. [address] any warranty or liability for your use of this information.   ·
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
## 2018-06-27 ENCOUNTER — OFFICE VISIT (OUTPATIENT)
Dept: INTERNAL MEDICINE CLINIC | Age: 38
End: 2018-06-27

## 2018-06-27 VITALS
DIASTOLIC BLOOD PRESSURE: 79 MMHG | BODY MASS INDEX: 20.23 KG/M2 | RESPIRATION RATE: 20 BRPM | WEIGHT: 125.9 LBS | OXYGEN SATURATION: 94 % | HEIGHT: 66 IN | HEART RATE: 95 BPM | SYSTOLIC BLOOD PRESSURE: 113 MMHG | TEMPERATURE: 97.6 F

## 2018-06-27 DIAGNOSIS — R56.9 SEIZURES (HCC): ICD-10-CM

## 2018-06-27 DIAGNOSIS — F20.9 SCHIZOPHRENIA, UNSPECIFIED TYPE (HCC): ICD-10-CM

## 2018-06-27 DIAGNOSIS — I10 ESSENTIAL HYPERTENSION: ICD-10-CM

## 2018-06-27 DIAGNOSIS — J44.9 COPD WITH ASTHMA (HCC): Primary | ICD-10-CM

## 2018-06-27 DIAGNOSIS — G89.4 CHRONIC PAIN SYNDROME: ICD-10-CM

## 2018-06-27 DIAGNOSIS — E11.9 TYPE 2 DIABETES MELLITUS WITHOUT COMPLICATION, WITHOUT LONG-TERM CURRENT USE OF INSULIN (HCC): ICD-10-CM

## 2018-06-27 RX ORDER — MELOXICAM 15 MG/1
15 TABLET ORAL DAILY
Qty: 30 TAB | Refills: 11 | Status: SHIPPED | OUTPATIENT
Start: 2018-06-27

## 2018-06-27 RX ORDER — ALBUTEROL SULFATE 90 UG/1
2 AEROSOL, METERED RESPIRATORY (INHALATION)
Qty: 1 INHALER | Refills: 11 | Status: SHIPPED | OUTPATIENT
Start: 2018-06-27

## 2018-06-27 NOTE — MR AVS SNAPSHOT
[address] [place] [address] [address] 
[phone] Patient: Benjamin Carrera MRN: [id number] JUE:[id number] Visit Information Date & Time Provider Department Dept. Phone Encounter #  
 6/27/2018 10:30 AM Sher Prince MD [facility] [address] [id number] Your Appointments 8/2/2018  2:00 PM  
ESTABLISHED PATIENT with Ole Gan NP [facility] ) Appt Note: 5 week follow up [address] [facility] [address] [place]  
[phone]  
  
   
 [address] [address]  
  
    
 8/8/2018  1:20 PM  
Follow Up with [address] [place] [address] [facility]rology [facility] at [facility] ) Appt Note: 2 month f/u; r/s from 8/6/18/ 2 month follow up on MRI results [facility] [address] [address]  
[phone]  
  
   
 [address] [place] [address] Upcoming Health Maintenance Date Due  
 FOOT EXAM Q1 9/16/1988 MICROALBUMIN Q1 9/16/1988 EYE EXAM RETINAL OR DILATED Q1 9/16/1988 Pneumococcal 19-64 Medium Risk (1 of 1 - PPSV23) 5/16/2019* DTaP/Tdap/Td series (1 - Tdap) 5/16/2019* Influenza Age 5 to Adult 8/1/2018 HEMOGLOBIN A1C Q6M 11/16/2018 LIPID PANEL Q1 5/16/2019 *Topic was postponed. The date shown is not the original due date. Allergies as of 6/27/2018  Review Complete On: 6/27/2018 By: Rakan Pineda LPN Severity Noted Reaction Type Reactions Codeine  01/05/2011    Other (comments) Remeron [Mirtazapine]  06/27/2018    Other (comments)  
 jerks Seroquel [Quetiapine]  01/05/2011    Other (comments) tremors Current Immunizations  Reviewed on 1/22/2013 No immunizations on file. Not reviewed this visit You Were Diagnosed With   
  
 Codes Comments COPD with asthma ([facility]    -  Primary ICD-10-CM: J44.9 ICD-9-CM: 493.20 Seizures ([facility]     ICD-10-CM: R56.9 ICD-9-CM: 780.39 Type 2 diabetes mellitus without complication, without long-term current use of insulin (HCC)     ICD-10-CM: E11.9 ICD-9-CM: 250.00 Schizophrenia, unspecified type ([facility]     ICD-10-CM: F20.9 ICD-9-CM: 295.90 Chronic pain syndrome     ICD-10-CM: G89.4 ICD-9-CM: 338.4 Essential hypertension     ICD-10-CM: I10 
ICD-9-CM: 401.9 Vitals BP Pulse Temp Resp Height(growth percentile) Weight(growth percentile) 113/79 95 97.6 °F (36.4 °C) (Oral) 20 5' 6\" (1.676 m) 125 lb 14.4 oz (57.1 kg) SpO2 BMI Smoking Status 94% 20.32 kg/m2 Current Every Day Smoker Vitals History BMI and BSA Data Body Mass Index Body Surface Area  
 20.32 kg/m 2 1.63 m 2 Preferred Pharmacy Pharmacy Name Phone [facility] [address], [address] [place] [address] [phone] Your Updated Medication List  
  
   
This list is accurate as of 6/27/18 12:40 PM.  Always use your most recent med list.  
  
  
  
  
 * albuterol 2.5 mg /3 mL (0.083 %) nebulizer solution Commonly known as:  PROVENTIL VENTOLIN  
3 mL by Nebulization route every four (4) hours as needed for Wheezing. * albuterol 90 mcg/actuation inhaler Commonly known as:  PROVENTIL HFA, VENTOLIN HFA, PROAIR HFA Take 2 Puffs by inhalation every four (4) hours as needed for Wheezing. amLODIPine 5 mg tablet Commonly known as:  Junito Claire TK 1 T PO QD  
  
 diclofenac EC 75 mg EC tablet Commonly known as:  VOLTAREN Take 1 Tab by mouth two (2) times a day. fluPHENAZine 10 mg tablet Commonly known as:  PROLIXIN Take 1 Tab by mouth daily. haloperidol 10 mg tablet Commonly known as:  HALDOL Please take half tablet daily at bedtime for 7 days and then take 1 tablet at bedtime  
  
 hydrOXYzine HCl 50 mg tablet Commonly known as:  ATARAX Take 1 Tab by mouth three (3) times daily as needed for up to 10 days. levETIRAcetam 500 mg tablet Commonly known as:  KEPPRA Take 2 Tabs by mouth two (2) times a day. metFORMIN  mg tablet Commonly known as:  GLUCOPHAGE XR Take 1 Tab by mouth daily (with dinner). mirtazapine 15 mg tablet Commonly known as:  Lawanda Velazquez Please take half tablet at bedtime for 5 days and then take 1 tablet at bedtime  
  
 prazosin 2 mg capsule Commonly known as:  MINIPRESS Take 1 Cap by mouth nightly. SYMBICORT 160-4.5 mcg/actuation [facility] Generic drug:  budesonide-formoterol Take 2 Puffs by inhalation two (2) times a day. traZODone 100 mg tablet Commonly known as:  Santana Galvan Please take one to two tabs prn for insomnia * Notice: This list has 2 medication(s) that are the same as other medications prescribed for you. Read the directions carefully, and ask your doctor or other care provider to review them with you. Prescriptions Sent to Pharmacy Refills  
 albuterol (PROVENTIL HFA, VENTOLIN HFA, PROAIR HFA) 90 mcg/actuation inhaler 11 Sig: Take 2 Puffs by inhalation every four (4) hours as needed for Wheezing. Class: Normal  
 Pharmacy: [facility] [address] [place] [address] #: [phone] Route: Inhalation We Performed the Following  DIABETES FOOT EXAM [7 Custom] MICROALBUMIN, UR, RAND W/ MICROALB/CREAT RATIO [id number] CPT(R)] REFERRAL TO [facility] [place] [facility] [id number] Custom] REFERRAL TO OPHTHALMOLOGY [REF57 Custom] To-Do List   
 07/03/2018 8:00 AM  
  [facility] ([phone]) The patient's hair must be clean without oils, mousse, and spray, no juan f or metal in the hair. Referral Information Referral ID Referred By Referred To  
  
 [id number] Shawanda Bold E Not Available Visits Status Start Date End Date 1 New Request 6/27/18 6/27/19 If your referral has a status of pending review or denied, additional information will be sent to support the outcome of this decision. Referral ID Referred By Referred To  
 [id number] Real NEGRON Not Available Visits Status Start Date End Date 1 New Request 6/27/18 6/27/19 If your referral has a status of pending review or denied, additional information will be sent to support the outcome of this decision. Introducing [facility]! Estrellita Mishra introduces [employer] patient portal. Now you can access parts of your medical record, email your doctor's office, and request medication refills online. 1. In your internet browser, go to [email or web address] 2. Click on the First Time User? Click Here link in the Sign In box. You will see the New Member Sign Up page. 3. Enter your [employer] Access Code exactly as it appears below. You will not need to use this code after youve completed the sign-up process. If you do not sign up before the expiration date, you must request a new code. · [employer] Access Code: [email or web address] Expires: 8/14/2018  9:47 AM 
 
4. Enter the last four digits of your Social Security Number (xxxx) and Date of Birth (mm/dd/yyyy) as indicated and click Submit. You will be taken to the next sign-up page. 5. Create a S[employer]cery ID. This will be your S[employer]cery login ID and cannot be changed, so think of one that is secure and easy to remember. 6. Create a [employer]cery password. You can change your password at any time. 7. Enter your Password Reset Question and Answer. This can be used at a later time if you forget your password. 8. Enter your e-mail address. You will receive e-mail notification when new information is available in [address] 9. Click Sign Up. You can now view and download portions of your medical record. 10. Click the Download Summary menu link to download a portable copy of your medical information. If you have questions, please visit the Frequently Asked Questions section of the [employer] website. Remember, [employer] is NOT to be used for urgent needs. For medical emergencies, dial 911. Now available from your iPhone and Android! Please provide this summary of care documentation to your next provider. Your primary care clinician is listed as Shahrzad Wilks. If you have any questions after today's visit, please call [phone].

## 2018-06-27 NOTE — PROGRESS NOTES
1. Have you been to the ER, urgent care clinic since your last visit? Hospitalized since your last visit? No    2. Have you seen or consulted any other health care providers outside of the [address] [place] [address] since your last visit? Include any pap smears or colon screening.  No     Requesting referral to pulmonary doctor  Needs help with diabetic education
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
## 2018-06-28 ENCOUNTER — TELEPHONE (OUTPATIENT)
Dept: BEHAVIORAL/MENTAL HEALTH CLINIC | Age: 38
End: 2018-06-28

## 2018-06-28 NOTE — TELEPHONE ENCOUNTER
Pts cm called and stated that he was having an allergic reaction to the Remeron, caused him to have jerks. States that he took it for one day and has not taken it since was wondering if you could call him back to discuss this with him and change the medication.

## 2018-07-03 ENCOUNTER — HOSPITAL ENCOUNTER (OUTPATIENT)
Dept: NEUROLOGY | Age: 38
Discharge: HOME | End: 2018-07-03
Attending: PSYCHIATRY & NEUROLOGY

## 2018-07-03 DIAGNOSIS — R56.9 SEIZURES (HCC): ICD-10-CM

## 2018-07-05 ENCOUNTER — TELEPHONE (OUTPATIENT)
Dept: BEHAVIORAL/MENTAL HEALTH CLINIC | Age: 38
End: 2018-07-05

## 2018-07-05 NOTE — TELEPHONE ENCOUNTER
Tried calling and his number is not in service. Other phone number is ? Giuliana Arteaga Will try again tomorrow. Client has began taking Mirtazapine  2 weeks ago and may have mild Gi symptoms, which may decrease.

## 2018-07-05 NOTE — TELEPHONE ENCOUNTER
Caregiver called in and stated that the patient is unable to take Remeron because it makes him sick. Can he have something else?

## 2018-07-09 ENCOUNTER — TELEPHONE (OUTPATIENT)
Dept: BEHAVIORAL/MENTAL HEALTH CLINIC | Age: 38
End: 2018-07-09

## 2018-07-09 ENCOUNTER — HOSPITAL ENCOUNTER (OUTPATIENT)
Dept: GENERAL RADIOLOGY | Age: 38
Discharge: HOME | End: 2018-07-09
Payer: MEDICAID

## 2018-07-09 DIAGNOSIS — R05.9 COUGH: ICD-10-CM

## 2018-07-09 DIAGNOSIS — F17.200 TOBACCO DEPENDENCE: ICD-10-CM

## 2018-07-09 DIAGNOSIS — R13.10 DYSPHAGIA: ICD-10-CM

## 2018-07-09 DIAGNOSIS — R63.4 EXCESSIVE WEIGHT LOSS: ICD-10-CM

## 2018-07-09 DIAGNOSIS — K21.9 GERD (GASTROESOPHAGEAL REFLUX DISEASE): ICD-10-CM

## 2018-07-09 PROCEDURE — 71046 X-RAY EXAM CHEST 2 VIEWS: CPT

## 2018-07-12 ENCOUNTER — OFFICE VISIT (OUTPATIENT)
Dept: NEUROLOGY | Age: 38
End: 2018-07-12

## 2018-07-12 VITALS
HEART RATE: 87 BPM | RESPIRATION RATE: 18 BRPM | OXYGEN SATURATION: 98 % | BODY MASS INDEX: 21.05 KG/M2 | HEIGHT: 66 IN | SYSTOLIC BLOOD PRESSURE: 106 MMHG | DIASTOLIC BLOOD PRESSURE: 62 MMHG | WEIGHT: 131 LBS

## 2018-07-12 DIAGNOSIS — R41.3 MEMORY LOSS: ICD-10-CM

## 2018-07-12 DIAGNOSIS — R56.9 SEIZURES (HCC): Primary | ICD-10-CM

## 2018-07-12 DIAGNOSIS — T43.505A NEUROLEPTIC-INDUCED TARDIVE DYSKINESIA: ICD-10-CM

## 2018-07-12 DIAGNOSIS — G24.01 NEUROLEPTIC-INDUCED TARDIVE DYSKINESIA: ICD-10-CM

## 2018-07-12 RX ORDER — LAMOTRIGINE 100 MG/1
100 TABLET, EXTENDED RELEASE ORAL DAILY
Qty: 30 TAB | Refills: 5 | Status: SHIPPED | OUTPATIENT
Start: 2018-07-12 | End: 2018-08-13

## 2018-07-12 RX ORDER — LAMOTRIGINE 25 MG/1
TABLET, EXTENDED RELEASE ORAL
Qty: 42 TAB | Refills: 0 | Status: SHIPPED | OUTPATIENT
Start: 2018-07-12 | End: 2018-08-13

## 2018-07-12 NOTE — MR AVS SNAPSHOT
[facility] [address] [id number] Patient: Kalman [place] MRN: [id number] Visit Information Date & Time Provider Department Dept. Phone Encounter #  
 7/12/2018  9:00 AM [facility] at [address] [place] [address] [id number] Your Appointments 7/13/2018 10:00 AM  
ESTABLISHED PATIENT with Tran Cochran NP [facility] ([facility]) Appt Note: 5 week follow up; having difficulty with Remeron/ scheduled an emergent appt 7.9.18   
 233 [facility] 101 435 [facility]  
[phone]  
  
   
 1350 [facility] 3[address] [facility] [address]  
  
    
 8/8/2018  1:20 PM  
Follow Up with [facility], DO Brianna [facility] at [facility] [facility]) Appt Note: 2 month f/u; r/s from 8/6/18/ 2 month follow up on MRI results [facility] [address]  
[phone]  
  
   
 [address] [facility] [address]  
  
    
 8/30/2018 12:30 PM  
Any with Demetris Saenz MD  
[address] [place] [address] and Primary Care [address] [place] [address]) Appt Note: 2 month follow up  
 [address] [facility]  
  
   
 [address] [address] Upcoming Health Maintenance Date Due MICROALBUMIN Q1 9/16/1988 EYE EXAM RETINAL OR DILATED Q1 9/16/1988 Pneumococcal 19-64 Medium Risk (1 of 1 - PPSV23) 5/16/2019* DTaP/Tdap/Td series (1 - Tdap) 5/16/2019* Influenza Age 5 to Adult 8/1/2018 HEMOGLOBIN A1C Q6M 11/16/2018 LIPID PANEL Q1 5/16/2019 FOOT EXAM Q1 6/27/2019 *Topic was postponed. The date shown is not the original due date. Allergies as of 7/12/2018  Review Complete On: 7/12/2018 By: Ami Mcgee LPN Severity Noted Reaction Type Reactions Codeine  01/05/2011    Other (comments) Remeron [Mirtazapine]  06/27/2018    Other (comments)  
 ken Seroquel [Quetiapine]  01/05/2011    Other (comments) tremors Current Immunizations  Reviewed on 1/22/2013 No immunizations on file. Not reviewed this visit You Were Diagnosed With   
  
 Codes Comments Seizures ([facility]    -  Primary ICD-10-CM: R56.9 ICD-9-CM: 780.39 Neuroleptic-induced tardive dyskinesia     ICD-10-CM: G24.01, T43.505A ICD-9-CM: 333.85, E939.3 Vitals BP Pulse Resp Height(growth percentile) Weight(growth percentile) SpO2  
 106/62 87 18 5' 6\" (1.676 m) 131 lb (59.4 kg) 98% BMI Smoking Status 21.14 kg/m2 Current Every Day Smoker Vitals History BMI and BSA Data Body Mass Index Body Surface Area  
 21.14 kg/m 2 1.66 m 2 Preferred Pharmacy Pharmacy Name Phone [facility]  [address], [address] [place] [address] [phone] Your Updated Medication List  
  
   
This list is accurate as of 7/12/18  9:32 AM.  Always use your most recent med list.  
  
  
  
  
 * albuterol 2.5 mg /3 mL (0.083 %) nebulizer solution Commonly known as:  PROVENTIL VENTOLIN  
3 mL by Nebulization route every four (4) hours as needed for Wheezing. * albuterol 90 mcg/actuation inhaler Commonly known as:  PROVENTIL HFA, VENTOLIN HFA, PROAIR HFA Take 2 Puffs by inhalation every four (4) hours as needed for Wheezing. amLODIPine 5 mg tablet Commonly known as:  Jennifer Chapman TK 1 T PO QD  
  
 fluPHENAZine 10 mg tablet Commonly known as:  PROLIXIN Take 1 Tab by mouth daily. haloperidol 10 mg tablet Commonly known as:  HALDOL Please take half tablet daily at bedtime for 7 days and then take 1 tablet at bedtime * lamoTRIgine 25 mg Tr24 ER tablet Commonly known as: LaMICtal XR Take 1 tab daily by mouth for 2 weeks, then 2 tabs daily by mouth for 2 weeks * lamoTRIgine 100 mg Tr24 ER tablet Commonly known as: LaMICtal XR Take 1 Tab by mouth daily. levETIRAcetam 500 mg tablet Commonly known as:  KEPPRA Take 2 Tabs by mouth two (2) times a day. meloxicam 15 mg tablet Commonly known as:  MOBIC Take 1 Tab by mouth daily. metFORMIN  mg tablet Commonly known as:  GLUCOPHAGE XR Take 1 Tab by mouth daily (with dinner). mirtazapine 15 mg tablet Commonly known as:  Fanta Goodness Please take half tablet at bedtime for 5 days and then take 1 tablet at bedtime  
  
 prazosin 2 mg capsule Commonly known as:  MINIPRESS Take 1 Cap by mouth nightly. SYMBICORT 160-4.5 mcg/actuation [facility] Generic drug:  budesonide-formoterol Take 2 Puffs by inhalation two (2) times a day. traZODone 100 mg tablet Commonly known as:  Krunal Lipps Please take one to two tabs prn for insomnia * Notice: This list has 4 medication(s) that are the same as other medications prescribed for you. Read the directions carefully, and ask your doctor or other care provider to review them with you. Prescriptions Sent to Pharmacy Refills  
 lamoTRIgine (LAMICTAL XR) 25 mg tr24 ER tablet 0 Sig: Take 1 tab daily by mouth for 2 weeks, then 2 tabs daily by mouth for 2 weeks Class: Normal  
 Pharmacy: [facility] [address] [place] [address] Lalita Jesús HERNANDEZ AT [facility] Ph #: [phone]  
 lamoTRIgine (LAMICTAL XR) 100 mg tr24 ER tablet 5 Sig: Take 1 Tab by mouth daily. Class: Normal  
 Pharmacy: [facility] [address] Ph #: [phone] Route: Oral  
  
To-Do List   
 2018 11:00 AM  
  Appointment with Kemi Salgado MD; [place] [address] at [address] [place] [address] Rd ([phone]) Patient needs to register 30 minutes prior to exam.  1.  Adults:  Nothing to eat or drink after midnight.  2.   to 6 months:  Nothing to eat or drink for 3 hours prior to the study. 3.  6 months to 1 year:  Nothing to eat or drink for 4 hours prior to the study. 4.  1 year to 4 years:  Nothing to eat or drink after midnight except for routine medications, if early morning study.  Otherwise, nothing to eat or drink 4 hours prior to study. 5.  5 years to 12 years:  Nothing to eat or drink after midnight except for routine medications, if early morning study.  Otherwise, nothing to eat or drink 6 hours prior to study.  6.  13 years to 18 years:  Nothing to eat or drink after midnight except for routing medications, if early morning study.  Otherwise, nothing to eat or drink 8 hours prior to study.  7.  You must bring a LIST or BAG of all current medication you are taking with you to your appointment. 07/24/2018 8:00 AM  
  Appointment with EEG [facility] at [facility] ([phone]) The patient's hair must be clean without oils, mousse, and spray, no juan f or metal in the hair. Patient Instructions Lamotrigine (Lamictal) ER Start with 25 mg tabs. Take 1 tab once daily for 2 weeks Then take 2 tabs once daily for 2 weeks Then you will start the full dose of one 100 mg tab once daily At your next visit, you can discuss weaning off of your Keppra (levetiracetam) Please discuss your tremors with your psychiatrist, you may need a medication other than Haldol as it is likely causing your tremors PRESCRIPTION REFILL POLICY [facility] Statement to Patients April 1, 2014 In an effort to ensure the large volume of patient prescription refills is processed in the most efficient and expeditious manner, we are asking our patients to assist us by calling your Pharmacy for all prescription refills, this will include also your  Mail Order Pharmacy. The pharmacy will contact our office electronically to continue the refill process. Please do not wait until the last minute to call your pharmacy.  We need at least 48 hours (2days) to fill prescriptions. We also encourage you to call your pharmacy before going to  your prescription to make sure it is ready. With regard to controlled substance prescription refill requests (narcotic refills) that need to be picked up at our office, we ask your cooperation by providing us with at least 72 hours (3days) notice that you will need a refill. We will not refill narcotic prescription refill requests after 4:00pm on any weekday, Monday through Thursday, or after 2:00pm on Fridays, or on the weekends. We encourage everyone to explore another way of getting your prescription refill request processed using [employer], our patient web portal through our electronic medical record system. [employer] is an efficient and effective way to communicate your medication request directly to the office and  downloadable as an reid on your smart phone . [employer] also features a review functionality that allows you to view your medication list as well as leave messages for your physician. Are you ready to get connected? If so please review the attatched instructions or speak to any of our staff to get you set up right away! Thank you so much for your cooperation. Should you have any questions please contact our Practice Administrator. The Physicians and Staff,  [facility] A Healthy Lifestyle: Care Instructions Your Care Instructions A healthy lifestyle can help you feel good, stay at a healthy weight, and have plenty of energy for both work and play. A healthy lifestyle is something you can share with your whole family. A healthy lifestyle also can lower your risk for serious health problems, such as high blood pressure, heart disease, and diabetes. You can follow a few steps listed below to improve your health and the health of your family. Follow-up care is a key part of your treatment and safety.  Be sure to make and go to all appointments, and call your doctor if you are having problems. It's also a good idea to know your test results and keep a list of the medicines you take. How can you care for yourself at home? · Do not eat too much sugar, fat, or fast foods. You can still have dessert and treats now and then. The goal is moderation. · Start small to improve your eating habits. Pay attention to portion sizes, drink less juice and soda pop, and eat more fruits and vegetables. ¨ Eat a healthy amount of food. A 3-ounce serving of meat, for example, is about the size of a deck of cards. Fill the rest of your plate with vegetables and whole grains. ¨ Limit the amount of soda and sports drinks you have every day. Drink more water when you are thirsty. ¨ Eat at least 5 servings of fruits and vegetables every day. It may seem like a lot, but it is not hard to reach this goal. A serving or helping is 1 piece of fruit, 1 cup of vegetables, or 2 cups of leafy, raw vegetables. Have an apple or some carrot sticks as an afternoon snack instead of a candy bar. Try to have fruits and/or vegetables at every meal. 
· Make exercise part of your daily routine. You may want to start with simple activities, such as walking, bicycling, or slow swimming. Try to be active 30 to 60 minutes every day. You do not need to do all 30 to 60 minutes all at once. For example, you can exercise 3 times a day for 10 or 20 minutes. Moderate exercise is safe for most people, but it is always a good idea to talk to your doctor before starting an exercise program. 
· Keep moving. Famarah Bevels the lawn, work in the garden, or GoChime. Take the stairs instead of the elevator at work. · If you smoke, quit. People who smoke have an increased risk for heart attack, stroke, cancer, and other lung illnesses. Quitting is hard, but there are ways to boost your chance of quitting tobacco for good. ¨ Use nicotine gum, patches, or lozenges. ¨ Ask your doctor about stop-smoking programs and medicines. ¨ Keep trying. In addition to reducing your risk of diseases in the future, you will notice some benefits soon after you stop using tobacco. If you have shortness of breath or asthma symptoms, they will likely get better within a few weeks after you quit. · Limit how much alcohol you drink. Moderate amounts of alcohol (up to 2 drinks a day for men, 1 drink a day for women) are okay. But drinking too much can lead to liver problems, high blood pressure, and other health problems. Family health If you have a family, there are many things you can do together to improve your health. · Eat meals together as a family as often as possible. · Eat healthy foods. This includes fruits, vegetables, lean meats and dairy, and whole grains. · Include your family in your fitness plan. Most people think of activities such as jogging or tennis as the way to fitness, but there are many ways you and your family can be more active. Anything that makes you breathe hard and gets your heart pumping is exercise. Here are some tips: 
¨ Walk to do errands or to take your child to school or the bus. ¨ Go for a family bike ride after dinner instead of watching TV. Where can you learn more? Go to http://barrett-dawson.info/. Enter O266 in the search box to learn more about \"A Healthy Lifestyle: Care Instructions. \" Current as of: December 7, 2017 Content Version: 11.7 © 6237-5550 Healthwise, Incorporated. Care instructions adapted under license by [employer] (which disclaims liability or warranty for this information). If you have questions about a medical condition or this instruction, always ask your healthcare professional. [address] any warranty or liability for your use of this information. Introducing [facility]!    
 Kathy Hickey introduces [employer] patient portal. Now you can access parts of your medical record, email your doctor's office, and request medication refills online. 1. In your internet browser, go to [email or web address] 2. Click on the First Time User? Click Here link in the Sign In box. You will see the New Member Sign Up page. 3. Enter your [employer] Access Code exactly as it appears below. You will not need to use this code after youve completed the sign-up process. If you do not sign up before the expiration date, you must request a new code. · [employer] Access Code: [email or web address] Expires: 8/14/2018  9:47 AM 
 
4. Enter the last four digits of your Social Security Number (xxxx) and Date of Birth (mm/dd/yyyy) as indicated and click Submit. You will be taken to the next sign-up page. 5. Create a [employer] ID. This will be your [employer] login ID and cannot be changed, so think of one that is secure and easy to remember. 6. Create a [employer] password. You can change your password at any time. 7. Enter your Password Reset Question and Answer. This can be used at a later time if you forget your password. 8. Enter your e-mail address. You will receive e-mail notification when new information is available in [address] 9. Click Sign Up. You can now view and download portions of your medical record. 10. Click the Download Summary menu link to download a portable copy of your medical information. If you have questions, please visit the Frequently Asked Questions section of the [employer] website. Remember, [employer] is NOT to be used for urgent needs. For medical emergencies, dial 911. Now available from your iPhone and Android! Please provide this summary of care documentation to your next provider. Your primary care clinician is listed as Janeth Aldrich. If you have any questions after today's visit, please call [phone].
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
## 2018-07-12 NOTE — PROGRESS NOTES
Patient here for follow up on tremors and seizures. Family member reported patient has to take an OTC sleep aid for his tremors to stop.

## 2018-07-12 NOTE — PATIENT INSTRUCTIONS
Lamotrigine (Lamictal) ER  Start with 25 mg tabs. Take 1 tab once daily for 2 weeks  Then take 2 tabs once daily for 2 weeks    Then you will start the full dose of one 100 mg tab once daily    At your next visit, you can discuss weaning off of your Keppra (levetiracetam)    Please discuss your tremors with your psychiatrist, you may need a medication other than Haldol as it is likely causing your tremors          [facility]   Statement to Patients  April 1, 2014      In an effort to ensure the large volume of patient prescription refills is processed in the most efficient and expeditious manner, we are asking our patients to assist us by calling your Pharmacy for all prescription refills, this will include also your  Mail Order Pharmacy. The pharmacy will contact our office electronically to continue the refill process. Please do not wait until the last minute to call your pharmacy. We need at least 48 hours (2days) to fill prescriptions. We also encourage you to call your pharmacy before going to  your prescription to make sure it is ready. With regard to controlled substance prescription refill requests (narcotic refills) that need to be picked up at our office, we ask your cooperation by providing us with at least 72 hours (3days) notice that you will need a refill. We will not refill narcotic prescription refill requests after 4:00pm on any weekday, Monday through Thursday, or after 2:00pm on Fridays, or on the weekends. We encourage everyone to explore another way of getting your prescription refill request processed using [employer], our patient web portal through our electronic medical record system. Scards is an efficient and effective way to communicate your medication request directly to the office and  downloadable as an reid on your smart phone .  Scards also features a review functionality that allows you to view your medication list as well as leave messages for your physician. Are you ready to get connected? If so please review the attatched instructions or speak to any of our staff to get you set up right away! Thank you so much for your cooperation. Should you have any questions please contact our Practice Administrator. The Physicians and Staff,  [facility]                A Healthy Lifestyle: Care Instructions  Your Care Instructions    A healthy lifestyle can help you feel good, stay at a healthy weight, and have plenty of energy for both work and play. A healthy lifestyle is something you can share with your whole family. A healthy lifestyle also can lower your risk for serious health problems, such as high blood pressure, heart disease, and diabetes. You can follow a few steps listed below to improve your health and the health of your family. Follow-up care is a key part of your treatment and safety. Be sure to make and go to all appointments, and call your doctor if you are having problems. It's also a good idea to know your test results and keep a list of the medicines you take. How can you care for yourself at home? · Do not eat too much sugar, fat, or fast foods. You can still have dessert and treats now and then. The goal is moderation. · Start small to improve your eating habits. Pay attention to portion sizes, drink less juice and soda pop, and eat more fruits and vegetables. ¨ Eat a healthy amount of food. A 3-ounce serving of meat, for example, is about the size of a deck of cards. Fill the rest of your plate with vegetables and whole grains. ¨ Limit the amount of soda and sports drinks you have every day. Drink more water when you are thirsty. ¨ Eat at least 5 servings of fruits and vegetables every day. It may seem like a lot, but it is not hard to reach this goal. A serving or helping is 1 piece of fruit, 1 cup of vegetables, or 2 cups of leafy, raw vegetables.  Have an apple or some carrot sticks as an afternoon snack instead of a candy bar. Try to have fruits and/or vegetables at every meal.  · Make exercise part of your daily routine. You may want to start with simple activities, such as walking, bicycling, or slow swimming. Try to be active 30 to 60 minutes every day. You do not need to do all 30 to 60 minutes all at once. For example, you can exercise 3 times a day for 10 or 20 minutes. Moderate exercise is safe for most people, but it is always a good idea to talk to your doctor before starting an exercise program.  · Keep moving. [employer] the lawn, work in the garden, or [employer]. Take the stairs instead of the elevator at work. · If you smoke, quit. People who smoke have an increased risk for heart attack, stroke, cancer, and other lung illnesses. Quitting is hard, but there are ways to boost your chance of quitting tobacco for good. ¨ Use nicotine gum, patches, or lozenges. ¨ Ask your doctor about stop-smoking programs and medicines. ¨ Keep trying. In addition to reducing your risk of diseases in the future, you will notice some benefits soon after you stop using tobacco. If you have shortness of breath or asthma symptoms, they will likely get better within a few weeks after you quit. · Limit how much alcohol you drink. Moderate amounts of alcohol (up to 2 drinks a day for men, 1 drink a day for women) are okay. But drinking too much can lead to liver problems, high blood pressure, and other health problems. Family health  If you have a family, there are many things you can do together to improve your health. · Eat meals together as a family as often as possible. · Eat healthy foods. This includes fruits, vegetables, lean meats and dairy, and whole grains. · Include your family in your fitness plan. Most people think of activities such as jogging or tennis as the way to fitness, but there are many ways you and your family can be more active.  Anything that makes you breathe hard and gets your heart pumping is exercise. Here are some tips:  ¨ Walk to do errands or to take your child to school or the bus. ¨ Go for a family bike ride after dinner instead of watching TV. Where can you learn more? Go to http://barrett-dawson.info/. Enter K314 in the search box to learn more about \"A Healthy Lifestyle: Care Instructions. \"  Current as of: December 7, 2017  Content Version: 11.7  © 5489-6932 [employer], [employer]. Care instructions adapted under license by [employer] (which disclaims liability or warranty for this information). If you have questions about a medical condition or this instruction, always ask your healthcare professional. [address] any warranty or liability for your use of this information.

## 2018-07-13 ENCOUNTER — OFFICE VISIT (OUTPATIENT)
Dept: BEHAVIORAL/MENTAL HEALTH CLINIC | Age: 38
End: 2018-07-13

## 2018-07-13 VITALS
WEIGHT: 130 LBS | HEIGHT: 66 IN | HEART RATE: 95 BPM | DIASTOLIC BLOOD PRESSURE: 95 MMHG | BODY MASS INDEX: 20.89 KG/M2 | SYSTOLIC BLOOD PRESSURE: 136 MMHG

## 2018-07-13 DIAGNOSIS — F19.94 SUBSTANCE INDUCED MOOD DISORDER (HCC): ICD-10-CM

## 2018-07-13 DIAGNOSIS — F43.10 PTSD (POST-TRAUMATIC STRESS DISORDER): ICD-10-CM

## 2018-07-13 DIAGNOSIS — F19.951: Primary | ICD-10-CM

## 2018-07-13 DIAGNOSIS — Z78.9 CAFFEINE USE: ICD-10-CM

## 2018-07-13 DIAGNOSIS — G47.00 INSOMNIA, UNSPECIFIED TYPE: ICD-10-CM

## 2018-07-13 DIAGNOSIS — F19.980: ICD-10-CM

## 2018-07-13 RX ORDER — TRAZODONE HYDROCHLORIDE 100 MG/1
TABLET ORAL
Qty: 60 TAB | Refills: 0 | Status: SHIPPED | OUTPATIENT
Start: 2018-07-13 | End: 2018-08-02 | Stop reason: SDUPTHER

## 2018-07-13 RX ORDER — SERTRALINE HYDROCHLORIDE 100 MG/1
TABLET, FILM COATED ORAL
Qty: 45 TAB | Refills: 0 | Status: SHIPPED | OUTPATIENT
Start: 2018-07-13 | End: 2018-08-02 | Stop reason: SDUPTHER

## 2018-07-13 RX ORDER — ARIPIPRAZOLE 5 MG/1
TABLET ORAL
Qty: 30 TAB | Refills: 0 | Status: SHIPPED | OUTPATIENT
Start: 2018-07-13 | End: 2018-08-02 | Stop reason: SDUPTHER

## 2018-07-13 RX ORDER — GABAPENTIN 100 MG/1
100 CAPSULE ORAL 3 TIMES DAILY
Qty: 90 CAP | Refills: 0 | Status: SHIPPED | OUTPATIENT
Start: 2018-07-13 | End: 2018-08-02 | Stop reason: SDUPTHER

## 2018-07-13 RX ORDER — PRAZOSIN HYDROCHLORIDE 2 MG/1
2 CAPSULE ORAL
Qty: 30 CAP | Refills: 0 | Status: SHIPPED | OUTPATIENT
Start: 2018-07-13 | End: 2018-08-02 | Stop reason: SDUPTHER

## 2018-07-13 NOTE — MR AVS SNAPSHOT
[address] [facility] 
 
 
 [address] 
[phone] Patient: Enrique Best MRN: [id number] MNP:[id number] Visit Information Date & Time Provider Department Dept. Phone Encounter #  
 7/13/2018 10:00 AM Mariana Schaumann, [facility] [phone] [id number] Follow-up Instructions Return in about 4 weeks (around 8/10/2018) for med check and follow up. Your Appointments 8/8/2018  1:20 PM  
Follow Up with 2 [facility] Appt Note: 2 month f/u; r/s from 8/6/18/ 2 month follow up on MRI results 302 [facility] [address]  
[phone]  
  
   
 [address] [facility] [address]  
  
    
 8/30/2018 12:30 PM  
Any with Liz Saenz MD  
[address] [place] [address] and Primary Care [facility] Appt Note: 2 month follow up  
 [address] 1 [facility]  
  
   
 [address] [id number] Upcoming Health Maintenance Date Due MICROALBUMIN Q1 9/16/1988 EYE EXAM RETINAL OR DILATED Q1 9/16/1988 Pneumococcal 19-64 Medium Risk (1 of 1 - PPSV23) 5/16/2019* DTaP/Tdap/Td series (1 - Tdap) 5/16/2019* Influenza Age 5 to Adult 8/1/2018 HEMOGLOBIN A1C Q6M 11/16/2018 LIPID PANEL Q1 5/16/2019 FOOT EXAM Q1 6/27/2019 *Topic was postponed. The date shown is not the original due date. Allergies as of 7/13/2018  Review Complete On: 7/13/2018 By: Mariana Schaumann, NP Severity Noted Reaction Type Reactions Codeine  01/05/2011    Other (comments) Remeron [Mirtazapine]  06/27/2018    Other (comments)  
 jerks Seroquel [Quetiapine]  01/05/2011    Other (comments) tremors Current Immunizations  Reviewed on 1/22/2013 No immunizations on file. Not reviewed this visit You Were Diagnosed With   
  
 Codes Comments Substance or medication-induced psychotic disorder, with hallucinations ([facility]    -  Primary ICD-10-CM: O04.463 ICD-9-CM: 292.12, 305.90 PTSD (post-traumatic stress disorder)     ICD-10-CM: F43.10 ICD-9-CM: 309.81 Insomnia, unspecified type     ICD-10-CM: G47.00 ICD-9-CM: 780.52 Substance induced mood disorder (HCC)     ICD-10-CM: J20.52 ICD-9-CM: 292.84 Caffeine use     ICD-10-CM: Z78.9 ICD-9-CM: V49.89 Oth psychoactive substance use, unsp w anxiety disorder ([facility]     ICD-10-CM: Y40.627 ICD-9-CM: 292.89 Vitals BP Pulse Height(growth percentile) Weight(growth percentile) BMI Smoking Status (!) 136/95 95 5' 6\" (1.676 m) 130 lb (59 kg) 20.98 kg/m2 Current Every Day Smoker BMI and BSA Data Body Mass Index Body Surface Area  
 20.98 kg/m 2 1.66 m 2 Preferred Pharmacy Pharmacy Name Phone [facility] [address], [address] [place] [address] [phone] Your Updated Medication List  
  
   
This list is accurate as of 7/13/18 10:48 AM.  Always use your most recent med list.  
  
  
  
  
 * albuterol 2.5 mg /3 mL (0.083 %) nebulizer solution Commonly known as:  PROVENTIL VENTOLIN  
3 mL by Nebulization route every four (4) hours as needed for Wheezing. * albuterol 90 mcg/actuation inhaler Commonly known as:  PROVENTIL HFA, VENTOLIN HFA, PROAIR HFA Take 2 Puffs by inhalation every four (4) hours as needed for Wheezing. amLODIPine 5 mg tablet Commonly known as:  Tad Gordon TK 1 T PO QD  
  
 ARIPiprazole 5 mg tablet Commonly known as:  ABILIFY Please take half tablet daily for 7 days and then take 1 tablet daily  
  
 gabapentin 100 mg capsule Commonly known as:  NEURONTIN Take 1 Cap by mouth three (3) times daily. * lamoTRIgine 25 mg Tr24 ER tablet Commonly known as:   LaMICtal XR  
 Take 1 tab daily by mouth for 2 weeks, then 2 tabs daily by mouth for 2 weeks * lamoTRIgine 100 mg Tr24 ER tablet Commonly known as: LaMICtal XR Take 1 Tab by mouth daily. levETIRAcetam 500 mg tablet Commonly known as:  KEPPRA Take 2 Tabs by mouth two (2) times a day. meloxicam 15 mg tablet Commonly known as:  MOBIC Take 1 Tab by mouth daily. metFORMIN  mg tablet Commonly known as:  GLUCOPHAGE XR Take 1 Tab by mouth daily (with dinner). prazosin 2 mg capsule Commonly known as:  MINIPRESS Take 1 Cap by mouth nightly. sertraline 100 mg tablet Commonly known as:  ZOLOFT Please take half tablet daily for 7 daysn and then take 1 tablet daily for 7 days ,then take one and half tablet daily SYMBICORT 160-4.5 mcg/actuation [facility] Generic drug:  budesonide-formoterol Take 2 Puffs by inhalation two (2) times a day. traZODone 100 mg tablet Commonly known as:  Mechelle Montez Please take one to two tabs prn for insomnia * Notice: This list has 4 medication(s) that are the same as other medications prescribed for you. Read the directions carefully, and ask your doctor or other care provider to review them with you. Prescriptions Sent to Pharmacy Refills  
 prazosin (MINIPRESS) 2 mg capsule 0 Sig: Take 1 Cap by mouth nightly. Class: Normal  
 Pharmacy: [address] Ph #: [phone] Route: Oral  
 traZODone (DESYREL) 100 mg tablet 0 Sig: Please take one to two tabs prn for insomnia Class: Normal  
 Pharmacy: [address] Ph #: [phone] ARIPiprazole (ABILIFY) 5 mg tablet 0 Sig: Please take half tablet daily for 7 days and then take 1 tablet daily  Class: Normal  
 Pharmacy: [address], [address] [place] [address] AT [address] [facility] Ph #: [phone]  
 sertraline (ZOLOFT) 100 mg tablet 0 Sig: Please take half tablet daily for 7 daysn and then take 1 tablet daily for 7 days ,then take one and half tablet daily Class: Normal  
 Pharmacy: [facility] [address] AT [address] [facility] Ph #: [phone]  
 gabapentin (NEURONTIN) 100 mg capsule 0 Sig: Take 1 Cap by mouth three (3) times daily. Class: Normal  
 Pharmacy: [facility] [address], [address] [place] [address] Ph #: [phone] Route: Oral  
  
Follow-up Instructions Return in about 4 weeks (around 8/10/2018) for med check and follow up. To-Do List   
 2018 11:00 AM  
  Appointment with Melanie Wilkerson MD; [place] [address] at [address] ([phone]) Patient needs to register 30 minutes prior to exam.  1.  Adults:  Nothing to eat or drink after midnight. 2.   to 6 months:  Nothing to eat or drink for 3 hours prior to the study. 3.  6 months to 1 year:  Nothing to eat or drink for 4 hours prior to the study. 4.  1 year to 4 years:  Nothing to eat or drink after midnight except for routine medications, if early morning study.  Otherwise, nothing to eat or drink 4 hours prior to study. 5.  5 years to 12 years:  Nothing to eat or drink after midnight except for routine medications, if early morning study.  Otherwise, nothing to eat or drink 6 hours prior to study.  6.  13 years to 18 years:  Nothing to eat or drink after midnight except for routing medications, if early morning study.  Otherwise, nothing to eat or drink 8 hours prior to study.  7.  You must bring a LIST or BAG of all current medication you are taking with you to your appointment. 2018 8:00 AM  
  Appointment with [facility] [address] at [address] [facility] ([phone]) The patient's hair must be clean without oils, mousse, and spray, no juan f or metal in the hair. Introducing [address]! Idalmis Benitez introduces [employer] patient portal. Now you can access parts of your medical record, email your doctor's office, and request medication refills online. 1. In your internet browser, go to [email or web address] 2. Click on the First Time User? Click Here link in the Sign In box. You will see the New Member Sign Up page. 3. Enter your [employer] Access Code exactly as it appears below. You will not need to use this code after youve completed the sign-up process. If you do not sign up before the expiration date, you must request a new code. · [employer] Access Code: [email or web address] Expires: 8/14/2018  9:47 AM 
 
4. Enter the last four digits of your Social Security Number (xxxx) and Date of Birth (mm/dd/yyyy) as indicated and click Submit. You will be taken to the next sign-up page. 5. Create a [employer] ID. This will be your [employer] login ID and cannot be changed, so think of one that is secure and easy to remember. 6. Create a [employer] password. You can change your password at any time. 7. Enter your Password Reset Question and Answer. This can be used at a later time if you forget your password. 8. Enter your e-mail address. You will receive e-mail notification when new information is available in [address] 9. Click Sign Up. You can now view and download portions of your medical record. 10. Click the Download Summary menu link to download a portable copy of your medical information. If you have questions, please visit the Frequently Asked Questions section of the [employer] website. Remember, [employer] is NOT to be used for urgent needs. For medical emergencies, dial 911. Now available from your iPhone and Android! Please provide this summary of care documentation to your next provider. Your primary care clinician is listed as Prem Coley. If you have any questions after today's visit, please call [phone].
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
## 2018-07-17 RX ORDER — INSULIN PUMP SYRINGE, 3 ML
EACH MISCELLANEOUS
Qty: 1 KIT | Refills: 0 | Status: ON HOLD | OUTPATIENT
Start: 2018-07-17 | End: 2018-08-14 | Stop reason: CLARIF

## 2018-07-17 RX ORDER — LANCETS
EACH MISCELLANEOUS
Qty: 100 EACH | Refills: 11 | Status: ON HOLD | OUTPATIENT
Start: 2018-07-17 | End: 2018-08-14 | Stop reason: CLARIF

## 2018-07-18 ENCOUNTER — HOSPITAL ENCOUNTER (OUTPATIENT)
Dept: GENERAL RADIOLOGY | Age: 38
Discharge: HOME | End: 2018-07-18
Attending: NURSE PRACTITIONER
Payer: MEDICAID

## 2018-07-18 DIAGNOSIS — K21.9 GERD (GASTROESOPHAGEAL REFLUX DISEASE): ICD-10-CM

## 2018-07-18 DIAGNOSIS — R13.10 DYSPHAGIA: ICD-10-CM

## 2018-07-18 DIAGNOSIS — R63.4 EXCESSIVE WEIGHT LOSS: ICD-10-CM

## 2018-07-18 DIAGNOSIS — R05.9 COUGH: ICD-10-CM

## 2018-07-18 DIAGNOSIS — F17.200 TOBACCO DEPENDENCE: ICD-10-CM

## 2018-07-18 PROCEDURE — 74241 XR UPPER GI SERIES W KUB: CPT

## 2018-07-24 ENCOUNTER — HOSPITAL ENCOUNTER (OUTPATIENT)
Dept: NEUROLOGY | Age: 38
Discharge: HOME | End: 2018-07-24
Attending: PSYCHIATRY & NEUROLOGY
Payer: MEDICAID

## 2018-07-24 DIAGNOSIS — R56.9 SEIZURE (HCC): ICD-10-CM

## 2018-07-24 PROCEDURE — 95816 EEG AWAKE AND DROWSY: CPT

## 2018-07-30 ENCOUNTER — HOSPITAL ENCOUNTER (OUTPATIENT)
Dept: CT IMAGING | Age: 38
Discharge: HOME | End: 2018-07-30
Attending: NURSE PRACTITIONER
Payer: MEDICAID

## 2018-07-30 DIAGNOSIS — R10.9 ABDOMINAL PAIN OF MULTIPLE SITES: ICD-10-CM

## 2018-07-30 DIAGNOSIS — K31.89 MASS OF DUODENUM: ICD-10-CM

## 2018-07-30 DIAGNOSIS — R63.4 EXCESSIVE WEIGHT LOSS: ICD-10-CM

## 2018-07-30 LAB — CREAT BLD-MCNC: 0.9 MG/DL (ref 0.6–1.3)

## 2018-07-30 PROCEDURE — 74177 CT ABD & PELVIS W/CONTRAST: CPT

## 2018-07-30 PROCEDURE — 82565 ASSAY OF CREATININE: CPT

## 2018-07-30 PROCEDURE — 74011636320 HC RX REV CODE- 636/320: Performed by: NURSE PRACTITIONER

## 2018-07-30 RX ORDER — SODIUM CHLORIDE 0.9 % (FLUSH) 0.9 %
5-10 SYRINGE (ML) INJECTION
Status: COMPLETED | OUTPATIENT
Start: 2018-07-30 | End: 2018-07-30

## 2018-07-30 RX ADMIN — Medication 10 ML: at 08:40

## 2018-07-30 RX ADMIN — IOPAMIDOL 100 ML: 755 INJECTION, SOLUTION INTRAVENOUS at 08:39

## 2018-08-02 ENCOUNTER — OFFICE VISIT (OUTPATIENT)
Dept: BEHAVIORAL/MENTAL HEALTH CLINIC | Age: 38
End: 2018-08-02

## 2018-08-02 VITALS
HEART RATE: 122 BPM | DIASTOLIC BLOOD PRESSURE: 60 MMHG | WEIGHT: 127 LBS | SYSTOLIC BLOOD PRESSURE: 106 MMHG | HEIGHT: 66 IN | BODY MASS INDEX: 20.41 KG/M2

## 2018-08-02 DIAGNOSIS — F19.951: ICD-10-CM

## 2018-08-02 DIAGNOSIS — F19.94 SUBSTANCE INDUCED MOOD DISORDER (HCC): ICD-10-CM

## 2018-08-02 DIAGNOSIS — F19.980: ICD-10-CM

## 2018-08-02 DIAGNOSIS — G47.00 INSOMNIA, UNSPECIFIED TYPE: ICD-10-CM

## 2018-08-02 DIAGNOSIS — F43.10 PTSD (POST-TRAUMATIC STRESS DISORDER): ICD-10-CM

## 2018-08-02 RX ORDER — SERTRALINE HYDROCHLORIDE 100 MG/1
100 TABLET, FILM COATED ORAL DAILY
Qty: 60 TAB | Refills: 0 | Status: SHIPPED | OUTPATIENT
Start: 2018-08-02 | End: 2018-08-13

## 2018-08-02 RX ORDER — ARIPIPRAZOLE 5 MG/1
5 TABLET ORAL DAILY
Qty: 30 TAB | Refills: 0 | Status: SHIPPED | OUTPATIENT
Start: 2018-08-02 | End: 2018-08-13

## 2018-08-02 RX ORDER — PRAZOSIN HYDROCHLORIDE 2 MG/1
2 CAPSULE ORAL
Qty: 30 CAP | Refills: 0 | Status: SHIPPED | OUTPATIENT
Start: 2018-08-02 | End: 2018-01-01 | Stop reason: SDUPTHER

## 2018-08-02 RX ORDER — GABAPENTIN 100 MG/1
100 CAPSULE ORAL 2 TIMES DAILY
Qty: 60 CAP | Refills: 0 | Status: SHIPPED | OUTPATIENT
Start: 2018-08-02 | End: 2018-08-13

## 2018-08-02 RX ORDER — TRAZODONE HYDROCHLORIDE 100 MG/1
TABLET ORAL
Qty: 60 TAB | Refills: 0 | Status: SHIPPED | OUTPATIENT
Start: 2018-08-02 | End: 2018-08-13

## 2018-08-02 NOTE — MR AVS SNAPSHOT
[address] [facility] 
 
 
 [address] [facility] [address] [place] [address] 
[phone] Patient: Krysten Saucedo MRN: [id number] HNQ:[id number] Visit Information Date & Time Provider Department Dept. Phone Encounter #  
 8/2/2018 11:00 AM [facility] [phone] [id number] Your Appointments 8/8/2018  1:20 PM  
Follow Up with 812 [facility], [facility] at [address] [facility]) Appt Note: 2 month f/u; r/s from 8/6/18/ 2 month follow up on MRI results 302 [facility] [address]  
[phone]  
  
   
 [address]  
  
    
 8/30/2018 12:30 PM  
Any with Harmony Black MD  
[address] [place] [address] and Primary Care 365[address] [facility]) Appt Note: 2 month follow up  
 [address] [facility]  
  
   
 [address] [id number]  
  
    
 9/14/2018  9:00 AM  
ESTABLISHED PATIENT with Prisca Garsia [facility] ([address] [facility]) Appt Note: 4 week follow up [address] [facility] [address] [facility] [address]  
  
   
 8311 [facility] [address] [facility] [address] [facility] [address] Upcoming Health Maintenance Date Due MICROALBUMIN Q1 9/16/1988 EYE EXAM RETINAL OR DILATED Q1 9/16/1988 Influenza Age 5 to Adult 8/1/2018 Pneumococcal 19-64 Medium Risk (1 of 1 - PPSV23) 5/16/2019* DTaP/Tdap/Td series (1 - Tdap) 5/16/2019* HEMOGLOBIN A1C Q6M 11/16/2018 LIPID PANEL Q1 5/16/2019 FOOT EXAM Q1 6/27/2019 *Topic was postponed. The date shown is not the original due date. Allergies as of 8/2/2018  Review Complete On: 8/2/2018 By: Shira Kras Severity Noted Reaction Type Reactions Codeine  01/05/2011    Other (comments) Remeron [Mirtazapine]  06/27/2018    Other (comments)  
 ken Seroquel [Quetiapine]  01/05/2011    Other (comments) tremors Current Immunizations  Reviewed on 1/22/2013 No immunizations on file. Not reviewed this visit You Were Diagnosed With   
  
 Codes Comments Substance or medication-induced psychotic disorder, with hallucinations ([facility]     ICD-10-CM: Q80.967 ICD-9-CM: 292.12, 305.90 Substance induced mood disorder (HCC)     ICD-10-CM: A47.61 ICD-9-CM: 292.84 Oth psychoactive substance use, unsp w anxiety disorder ([facility].)     ICD-10-CM: Z31.251 ICD-9-CM: 292.89 Insomnia, unspecified type     ICD-10-CM: G47.00 ICD-9-CM: 780.52 PTSD (post-traumatic stress disorder)     ICD-10-CM: F43.10 ICD-9-CM: 309.81 Vitals BP Pulse Height(growth percentile) Weight(growth percentile) BMI Smoking Status 106/60 (!) 122 5' 6\" (1.676 m) 127 lb (57.6 kg) 20.5 kg/m2 Current Every Day Smoker Vitals History BMI and BSA Data Body Mass Index Body Surface Area 20.5 kg/m 2 1.64 m 2 Preferred Pharmacy Pharmacy Name Phone [facility]  [address], [address] [place] [address] [phone] Your Updated Medication List  
  
   
This list is accurate as of 8/2/18 11:00 AM.  Always use your most recent med list.  
  
  
  
  
 * albuterol 2.5 mg /3 mL (0.083 %) nebulizer solution Commonly known as:  PROVENTIL VENTOLIN  
3 mL by Nebulization route every four (4) hours as needed for Wheezing. * albuterol 90 mcg/actuation inhaler Commonly known as:  PROVENTIL HFA, VENTOLIN HFA, PROAIR HFA Take 2 Puffs by inhalation every four (4) hours as needed for Wheezing. amLODIPine 5 mg tablet Commonly known as:  Rivera Fanti TK 1 T PO QD  
  
 ARIPiprazole 5 mg tablet Commonly known as:  ABILIFY Take 1 Tab by mouth daily. Blood-Glucose Meter monitoring kit Commonly known as:  FREESTYLE LITE METER Test twice a day dx. e119  
  
 gabapentin 100 mg capsule Commonly known as:  NEURONTIN Take 1 Cap by mouth two (2) times a day. glucose blood VI test strips strip Commonly known as:  FREESTYLE LITE STRIPS Test twice a day dx. e119 * lamoTRIgine 25 mg Tr24 ER tablet Commonly known as: LaMICtal XR Take 1 tab daily by mouth for 2 weeks, then 2 tabs daily by mouth for 2 weeks * lamoTRIgine 100 mg Tr24 ER tablet Commonly known as: LaMICtal XR Take 1 Tab by mouth daily. Lancets Misc Test twice a day dx. e119  
  
 levETIRAcetam 500 mg tablet Commonly known as:  KEPPRA Take 2 Tabs by mouth two (2) times a day. meloxicam 15 mg tablet Commonly known as:  MOBIC Take 1 Tab by mouth daily. metFORMIN  mg tablet Commonly known as:  GLUCOPHAGE XR Take 1 Tab by mouth daily (with dinner). prazosin 2 mg capsule Commonly known as:  MINIPRESS Take 1 Cap by mouth nightly. sertraline 100 mg tablet Commonly known as:  ZOLOFT Take 1 Tab by mouth daily. SYMBICORT 160-4.5 mcg/actuation [facility] Generic drug:  budesonide-formoterol Take 2 Puffs by inhalation two (2) times a day. traZODone 100 mg tablet Commonly known as:  Jose Elkins Please take one to two tabs prn for insomnia * Notice: This list has 4 medication(s) that are the same as other medications prescribed for you. Read the directions carefully, and ask your doctor or other care provider to review them with you. Prescriptions Sent to Pharmacy Refills ARIPiprazole (ABILIFY) 5 mg tablet 0 Sig: Take 1 Tab by mouth daily. Class: Normal  
 Pharmacy: [facility] [address] [place] [address] Ph #: [phone] Route: Oral  
 gabapentin (NEURONTIN) 100 mg capsule 0 Sig: Take 1 Cap by mouth two (2) times a day. Class: Normal  
 Pharmacy: [facility] [address] Ph #: [phone]  Route: Oral  
 traZODone (DESYREL) 100 mg tablet 0 Sig: Please take one to two tabs prn for insomnia Class: Normal  
 Pharmacy: [facility] [address] AT [facility] Ph #: [phone]  
 sertraline (ZOLOFT) 100 mg tablet 0 Sig: Take 1 Tab by mouth daily. Class: Normal  
 [facility] [address] [place] [address] Ph #: [phone] Route: Oral  
 prazosin (MINIPRESS) 2 mg capsule 0 Sig: Take 1 Cap by mouth nightly. Class: Normal  
 [facility] [address] [place] [address] Ph #: [phone] Route: Oral  
  
To-Do List   
 08/24/2018 9:00 AM  
  Appointment with DIABETES CLASS #1 [facility] DI[facility] ([phone]) Patient Instructions Sleep Tips What to avoid   
· Do not have drinks with caffeine, such as coffee or black tea, for 8 hours before bed. · Do not smoke or use other types of tobacco near bedtime. Nicotine is a stimulant and can keep you awake. · Avoid drinking alcohol late in the evening, because it can cause you to wake in the middle of the night. · Do not eat a big meal close to bedtime. If you are hungry, eat a light snack. · Do not drink a lot of water close to bedtime, because the need to urinate may wake you up during the night. · Do not read or watch TV in bed. Use the bed only for sleeping and sexual activity. What to try   
· Go to bed at the same time every night, and wake up at the same time every morning. Do not take naps during the day. · Keep your bedroom quiet, dark, and cool. · Get regular exercise, but not within 3 to 4 hours of your bedtime. · Sleep on a comfortable pillow and mattress. · If watching the clock makes you anxious, turn it facing away from you so you cannot see the time. · If you worry when you lie down, start a worry book.  Well before bedtime, write down your worries, and then set the book and your concerns aside. · Try meditation or other relaxation techniques before you go to bed. · If you cannot fall asleep, get up and go to another room until you feel sleepy. Do something relaxing. Repeat your bedtime routine before you go to bed again. Make your house quiet and calm about an hour before bedtime. Turn down the lights, turn off the TV, log off the computer, and turn down the volume on music. This can help you relax after a busy day. Recovering From Depression: Care Instructions Your Care Instructions Taking good care of yourself is important as you recover from depression. In time, your symptoms will fade as your treatment takes hold. Do not give up. Instead, focus your energy on getting better. Your mood will improve. It just takes some time. Focus on things that can help you feel better, such as being with friends and family, eating well, and getting enough rest. But take things slowly. Do not do too much too soon. You will begin to feel better gradually. Follow-up care is a key part of your treatment and safety. Be sure to make and go to all appointments, and call your doctor if you are having problems. It's also a good idea to know your test results and keep a list of the medicines you take. How can you care for yourself at home? Be realistic If you have a large task to do, break it up into smaller steps you can handle, and just do what you can. You may want to put off important decisions until your depression has lifted. If you have plans that will have a major impact on your life, such as marriage, divorce, or a job change, try to wait a bit. Talk it over with friends and loved ones who can help you look at the overall picture first. 
Reaching out to people for help is important. Do not isolate yourself. Let your family and friends help you. Find someone you can trust and confide in, and talk to that person. Be patient, and be kind to yourself. Remember that depression is not your fault and is not something you can overcome with willpower alone. Treatment is necessary for depression, just like for any other illness. Feeling better takes time, and your mood will improve little by little. Stay active Stay busy and get outside. Take a walk, or try some other light exercise. Talk with your doctor about an exercise program. Exercise can help with mild depression. Go to a movie or concert. Take part in a Mu-ism activity or other social gathering. Go to a ball game. Ask a friend to have dinner with you. Take care of yourself Eat a balanced diet with plenty of fresh fruits and vegetables, whole grains, and lean protein. If you have lost your appetite, eat small snacks rather than large meals. Avoid drinking alcohol or using illegal drugs. Do not take medicines that have not been prescribed for you. They may interfere with medicines you may be taking for depression, or they may make your depression worse. Take your medicines exactly as they are prescribed. You may start to feel better within 1 to 3 weeks of taking antidepressant medicine. But it can take as many as 6 to 8 weeks to see more improvement. If you have questions or concerns about your medicines, or if you do not notice any improvement by 3 weeks, talk to your doctor. If you have any side effects from your medicine, tell your doctor. Antidepressants can make you feel tired, dizzy, or nervous. Some people have dry mouth, constipation, headaches, sexual problems, or diarrhea. Many of these side effects are mild and will go away on their own after you have been taking the medicine for a few weeks. Some may last longer. Talk to your doctor if side effects are bothering you too much. You might be able to try a different medicine. Get enough sleep. If you have problems sleeping: Go to bed at the same time every night, and get up at the same time every morning. Keep your bedroom dark and quiet. Do not exercise after 5:00 p.m. Avoid drinks with caffeine after 5:00 p.m. Avoid sleeping pills unless they are prescribed by the doctor treating your depression. Sleeping pills may make you groggy during the day, and they may interact with other medicine you are taking. If you have any other illnesses, such as diabetes, heart disease, or high blood pressure, make sure to continue with your treatment. Tell your doctor about all of the medicines you take, including those with or without a prescription. Keep the numbers for these national suicide hotlines: 3-566-495-TALK (1-187.383.7334) and 4-246-HJAMPWU (8-709.716.4652). If you or someone you know talks about suicide or feeling hopeless, get help right away. When should you call for help? Call 911 anytime you think you may need emergency care. For example, call if: 
  You feel like hurting yourself or someone else.  
  Someone you know has depression and is about to attempt or is attempting suicide.  
 Call your doctor now or seek immediate medical care if: 
  You hear voices.  
  Someone you know has depression and: 
Starts to give away his or her possessions. Uses illegal drugs or drinks alcohol heavily. Talks or writes about death, including writing suicide notes or talking about guns, knives, or pills. Starts to spend a lot of time alone. Acts very aggressively or suddenly appears calm.  
 Watch closely for changes in your health, and be sure to contact your doctor if: 
  You do not get better as expected. Where can you learn more? Go to http://barrett-dawson.info/. Enter R856 in the search box to learn more about \"Recovering From Depression: Care Instructions. \" Current as of: December 7, 2017 Content Version: 11.7 © 0907-3988 [employer].  Care instructions adapted under license by [employer] (which disclaims liability or warranty for this information). If you have questions about a medical condition or this instruction, always ask your healthcare professional. [address] any warranty or liability for your use of this information. · Introducing [facility] & HEALTH SERVICES! [facility] introduces 3VR patient portal. Now you can access parts of your medical record, email your doctor's office, and request medication refills online. 1. In your internet browser, go to [email or web address] 2. Click on the First Time User? Click Here link in the Sign In box. You will see the New Member Sign Up page. 3. Enter your 3VR Access Code exactly as it appears below. You will not need to use this code after youve completed the sign-up process. If you do not sign up before the expiration date, you must request a new code. · 3VR Access Code: [email or web address] Expires: 8/14/2018  9:47 AM 
 
4. Enter the last four digits of your Social Security Number (xxxx) and Date of Birth (mm/dd/yyyy) as indicated and click Submit. You will be taken to the next sign-up page. 5. Create a 3VR ID. This will be your 3VR login ID and cannot be changed, so think of one that is secure and easy to remember. 6. Create a 3VR password. You can change your password at any time. 7. Enter your Password Reset Question and Answer. This can be used at a later time if you forget your password. 8. Enter your e-mail address. You will receive e-mail notification when new information is available in [address] 9. Click Sign Up. You can now view and download portions of your medical record. 10. Click the Download Summary menu link to download a portable copy of your medical information. If you have questions, please visit the Frequently Asked Questions section of the 3VR website. Remember, 3VR is NOT to be used for urgent needs. For medical emergencies, dial 911. Now available from your iPhone and Android! Please provide this summary of care documentation to your next provider. Your primary care clinician is listed as Brian Orellana. If you have any questions after today's visit, please call [phone].

## 2018-08-02 NOTE — PATIENT INSTRUCTIONS
Sleep Tips    What to avoid    · Do not have drinks with caffeine, such as coffee or black tea, for 8 hours before bed. · Do not smoke or use other types of tobacco near bedtime. Nicotine is a stimulant and can keep you awake. · Avoid drinking alcohol late in the evening, because it can cause you to wake in the middle of the night. · Do not eat a big meal close to bedtime. If you are hungry, eat a light snack. · Do not drink a lot of water close to bedtime, because the need to urinate may wake you up during the night. · Do not read or watch TV in bed. Use the bed only for sleeping and sexual activity. What to try    · Go to bed at the same time every night, and wake up at the same time every morning. Do not take naps during the day. · Keep your bedroom quiet, dark, and cool. · Get regular exercise, but not within 3 to 4 hours of your bedtime. · Sleep on a comfortable pillow and mattress. · If watching the clock makes you anxious, turn it facing away from you so you cannot see the time. · If you worry when you lie down, start a worry book. Well before bedtime, write down your worries, and then set the book and your concerns aside. · Try meditation or other relaxation techniques before you go to bed. · If you cannot fall asleep, get up and go to another room until you feel sleepy. Do something relaxing. Repeat your bedtime routine before you go to bed again. Make your house quiet and calm about an hour before bedtime. Turn down the lights, turn off the TV, log off the computer, and turn down the volume on music. This can help you relax after a busy day. Recovering From Depression: Care Instructions  Your Care Instructions    Taking good care of yourself is important as you recover from depression. In time, your symptoms will fade as your treatment takes hold. Do not give up. Instead, focus your energy on getting better. Your mood will improve. It just takes some time.  Focus on things that can help you feel better, such as being with friends and family, eating well, and getting enough rest. But take things slowly. Do not do too much too soon. You will begin to feel better gradually. Follow-up care is a key part of your treatment and safety. Be sure to make and go to all appointments, and call your doctor if you are having problems. It's also a good idea to know your test results and keep a list of the medicines you take. How can you care for yourself at home? Be realistic  If you have a large task to do, break it up into smaller steps you can handle, and just do what you can. You may want to put off important decisions until your depression has lifted. If you have plans that will have a major impact on your life, such as marriage, divorce, or a job change, try to wait a bit. Talk it over with friends and loved ones who can help you look at the overall picture first.  Reaching out to people for help is important. Do not isolate yourself. Let your family and friends help you. Find someone you can trust and confide in, and talk to that person. Be patient, and be kind to yourself. Remember that depression is not your fault and is not something you can overcome with willpower alone. Treatment is necessary for depression, just like for any other illness. Feeling better takes time, and your mood will improve little by little. Stay active  Stay busy and get outside. Take a walk, or try some other light exercise. Talk with your doctor about an exercise program. Exercise can help with mild depression. Go to a movie or concert. Take part in a Holiness activity or other social gathering. Go to a ball game. Ask a friend to have dinner with you. Take care of yourself  Eat a balanced diet with plenty of fresh fruits and vegetables, whole grains, and lean protein. If you have lost your appetite, eat small snacks rather than large meals. Avoid drinking alcohol or using illegal drugs.  Do not take medicines that have not been prescribed for you. They may interfere with medicines you may be taking for depression, or they may make your depression worse. Take your medicines exactly as they are prescribed. You may start to feel better within 1 to 3 weeks of taking antidepressant medicine. But it can take as many as 6 to 8 weeks to see more improvement. If you have questions or concerns about your medicines, or if you do not notice any improvement by 3 weeks, talk to your doctor. If you have any side effects from your medicine, tell your doctor. Antidepressants can make you feel tired, dizzy, or nervous. Some people have dry mouth, constipation, headaches, sexual problems, or diarrhea. Many of these side effects are mild and will go away on their own after you have been taking the medicine for a few weeks. Some may last longer. Talk to your doctor if side effects are bothering you too much. You might be able to try a different medicine. Get enough sleep. If you have problems sleeping:  Go to bed at the same time every night, and get up at the same time every morning. Keep your bedroom dark and quiet. Do not exercise after 5:00 p.m. Avoid drinks with caffeine after 5:00 p.m. Avoid sleeping pills unless they are prescribed by the doctor treating your depression. Sleeping pills may make you groggy during the day, and they may interact with other medicine you are taking. If you have any other illnesses, such as diabetes, heart disease, or high blood pressure, make sure to continue with your treatment. Tell your doctor about all of the medicines you take, including those with or without a prescription. Keep the numbers for these national suicide hotlines: 6-524-848-TALK (9-228.846.3046) and 2-348-GYFNMXF (8-428.874.4622). If you or someone you know talks about suicide or feeling hopeless, get help right away. When should you call for help? Call 911 anytime you think you may need emergency care.  For example, call if:    You feel like hurting yourself or someone else.     Someone you know has depression and is about to attempt or is attempting suicide.    Call your doctor now or seek immediate medical care if:    You hear voices.     Someone you know has depression and:  Starts to give away his or her possessions. Uses illegal drugs or drinks alcohol heavily. Talks or writes about death, including writing suicide notes or talking about guns, knives, or pills. Starts to spend a lot of time alone. Acts very aggressively or suddenly appears calm.    Watch closely for changes in your health, and be sure to contact your doctor if:    You do not get better as expected. Where can you learn more? Go to http://barrett-dawson.info/. Enter U025 in the search box to learn more about \"Recovering From Depression: Care Instructions. \"  Current as of: December 7, 2017  Content Version: 11.7  © 6873-8555 [employer]. Care instructions adapted under license by [employer] (which disclaims liability or warranty for this information). If you have questions about a medical condition or this instruction, always ask your healthcare professional. [address] any warranty or liability for your use of this information.   ·
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
## 2018-08-08 ENCOUNTER — OFFICE VISIT (OUTPATIENT)
Dept: NEUROLOGY | Age: 38
End: 2018-08-08

## 2018-08-08 VITALS
WEIGHT: 128 LBS | DIASTOLIC BLOOD PRESSURE: 86 MMHG | SYSTOLIC BLOOD PRESSURE: 126 MMHG | RESPIRATION RATE: 16 BRPM | HEIGHT: 66 IN | TEMPERATURE: 98.5 F | HEART RATE: 106 BPM | OXYGEN SATURATION: 96 % | BODY MASS INDEX: 20.57 KG/M2

## 2018-08-08 DIAGNOSIS — G40.409 SYMPTOMATIC GENERALIZED EPILEPSY (HCC): Primary | ICD-10-CM

## 2018-08-08 NOTE — PROGRESS NOTES
Identified pt with two pt identifiers(name and ). Reviewed record in preparation for visit and have obtained necessary documentation. Chief Complaint   Patient presents with    Seizure     pt states he has not experienced tremors or seizures since last appt; he reports feeling well and is eating much better    Results     f/u MRI done 18        Health Maintenance Due   Topic    MICROALBUMIN Q1     EYE EXAM RETINAL OR DILATED Q1     Influenza Age 5 to Adult    Mr. Britany Carter has a reminder for a \"due or due soon\" health maintenance. I have asked that he contact his primary care provider for follow-up on this health maintenance. Coordination of Care Questionnaire:  :   1) Have you been to an emergency room, urgent care clinic since your last visit? No  Hospitalized since your last visit? No         2. Have seen or consulted any other health care provider since your last visit? YES  If yes, where when, and reason for visit?    - 18: Dr. Domingo Varma [facility])  - : CT of pelvis  - 18: EEG    3) Do you have an Advanced Directive/ Living Will in place? NO  If yes, do we have a copy on file NO  If no, would you like information NO    Patient is accompanied by partner I have received verbal consent from Ayla Frank to discuss any/all medical information while they are present in the room.

## 2018-08-08 NOTE — MR AVS SNAPSHOT
[address] 
[phone] Patient: Alban Nieto MRN: [id number] EJS:[birth year] Visit Information Date & Time Provider Department Dept. Phone Encounter #  
 8/8/2018  1:20 PM [facility] at [address] [place] [address] [id number] Follow-up Instructions Return in about 3 months (around 11/8/2018). Routing History Follow-up and Disposition History Your Appointments 8/30/2018 12:30 PM  
Any with Manny Carter MD  
[address] [place] [address] and Primary Care [address] [place] [address]) Appt Note: 2 month follow up  
 CarloCapri Maichastity [address] [facility]  
  
   
 Soraya Crow [address] [id number]  
  
    
 9/14/2018  9:00 AM  
ESTABLISHED PATIENT with Deangelo Knox NP [facility] ([facility]) Appt Note: 4 week follow up [address] [address]  
[phone]  
  
   
 [address] [place] [address] 11/9/2018  2:40 PM  
Follow Up with Mt Aviles DO [facility] at [facility] [address] [place] [address]) Appt Note: f/u seizures/EAH  
 [address] [facility] [address]  
[phone]  
  
   
 [address] [place] [address] Upcoming Health Maintenance Date Due MICROALBUMIN Q1 9/16/1988 EYE EXAM RETINAL OR DILATED Q1 9/16/1988 Influenza Age 5 to Adult 8/1/2018 Pneumococcal 19-64 Medium Risk (1 of 1 - PPSV23) 5/16/2019* DTaP/Tdap/Td series (1 - Tdap) 5/16/2019* HEMOGLOBIN A1C Q6M 11/16/2018 LIPID PANEL Q1 5/16/2019 FOOT EXAM Q1 6/27/2019 *Topic was postponed. The date shown is not the original due date. Allergies as of 8/8/2018  Review Complete On: 8/8/2018 By: Mt Aviles DO Severity Noted Reaction Type Reactions Codeine  01/05/2011    Other (comments) Remeron [Mirtazapine]  06/27/2018    Other (comments)  
 ken Seroquel [Quetiapine]  01/05/2011    Other (comments) tremors Current Immunizations  Reviewed on 1/22/2013 No immunizations on file. Not reviewed this visit You Were Diagnosed With   
  
 Codes Comments Symptomatic generalized epilepsy ([facility] [address]    -  Primary ICD-10-CM: G40.409 ICD-9-CM: 345.90 Vitals BP Pulse Temp Resp Height(growth percentile) Weight(growth percentile) 126/86 (!) 106 98.5 °F (36.9 °C) (Oral) 16 5' 6\" (1.676 m) 128 lb (58.1 kg) SpO2 BMI Smoking Status 96% 20.66 kg/m2 Current Every Day Smoker Vitals History BMI and BSA Data Body Mass Index Body Surface Area  
 20.66 kg/m 2 1.64 m 2 [facility] [address], [address] [place] [address] [phone] Your Updated Medication List  
  
   
This list is accurate as of 8/8/18  2:08 PM.  Always use your most recent med list.  
  
  
  
  
 * albuterol 2.5 mg /3 mL (0.083 %) nebulizer solution Commonly known as:  PROVENTIL VENTOLIN  
3 mL by Nebulization route every four (4) hours as needed for Wheezing. * albuterol 90 mcg/actuation inhaler Commonly known as:  PROVENTIL HFA, VENTOLIN HFA, PROAIR HFA Take 2 Puffs by inhalation every four (4) hours as needed for Wheezing. amLODIPine 5 mg tablet Commonly known as:  Shea Spruce TK 1 T PO QD  
  
 ARIPiprazole 5 mg tablet Commonly known as:  ABILIFY Take 1 Tab by mouth daily. Blood-Glucose Meter monitoring kit Commonly known as:  FREESTYLE LITE METER Test twice a day dx. e119  
  
 gabapentin 100 mg capsule Commonly known as:  NEURONTIN Take 1 Cap by mouth two (2) times a day. glucose blood VI test strips strip Commonly known as:  FREESTYLE LITE STRIPS Test twice a day dx. e119 * lamoTRIgine 25 mg Tr24 ER tablet Commonly known as: LaMICtal XR Take 1 tab daily by mouth for 2 weeks, then 2 tabs daily by mouth for 2 weeks * lamoTRIgine 100 mg Tr24 ER tablet Commonly known as: LaMICtal XR Take 1 Tab by mouth daily. Lancets Misc Test twice a day dx. e119  
  
 levETIRAcetam 500 mg tablet Commonly known as:  KEPPRA Take 2 Tabs by mouth two (2) times a day. meloxicam 15 mg tablet Commonly known as:  MOBIC Take 1 Tab by mouth daily. metFORMIN  mg tablet Commonly known as:  GLUCOPHAGE XR Take 1 Tab by mouth daily (with dinner). prazosin 2 mg capsule Commonly known as:  MINIPRESS Take 1 Cap by mouth nightly. sertraline 100 mg tablet Commonly known as:  ZOLOFT Take 1 Tab by mouth daily. SYMBICORT 160-4.5 mcg/actuation Hfaa Generic drug:  budesonide-formoterol Take 2 Puffs by inhalation two (2) times a day. traZODone 100 mg tablet Commonly known as:  [place] Vero Please take one to two tabs prn for insomnia * Notice: This list has 4 medication(s) that are the same as other medications prescribed for you. Read the directions carefully, and ask your doctor or other care provider to review them with you. Follow-up Instructions Return in about 3 months (around 11/8/2018). To-Do List   
 08/24/2018 9:00 AM  
  Appointment with DIABETES CLASS #1 [facility] at [facility]ET[facility]AT[facility] ([phone]) Introducing [facility] & HEALTH SERVICES! [employer] introduces Vindicia patient portal. Now you can access parts of your medical record, email your doctor's office, and request medication refills online. 1. In your internet browser, go to [email or web address] 2. Click on the First Time User? Click Here link in the Sign In box. You will see the New Member Sign Up page. 3. Enter your Vindicia Access Code exactly as it appears below.  You will not need to use this code after youve completed the sign-up process. If you do not sign up before the expiration date, you must request a new code. · Asterisk Access Code: [email or web address] Expires: 8/14/2018  9:47 AM 
 
4. Enter the last four digits of your Social Security Number (xxxx) and Date of Birth (mm/dd/yyyy) as indicated and click Submit. You will be taken to the next sign-up page. 5. Create a Asterisk ID. This will be your Asterisk login ID and cannot be changed, so think of one that is secure and easy to remember. 6. Create a Asterisk password. You can change your password at any time. 7. Enter your Password Reset Question and Answer. This can be used at a later time if you forget your password. 8. Enter your e-mail address. You will receive e-mail notification when new information is available in [address] 9. Click Sign Up. You can now view and download portions of your medical record. 10. Click the Download Summary menu link to download a portable copy of your medical information. If you have questions, please visit the Frequently Asked Questions section of the Asterisk website. Remember, Asterisk is NOT to be used for urgent needs. For medical emergencies, dial 911. Now available from your iPhone and Android! Please provide this summary of care documentation to your next provider. Your primary care clinician is listed as Chris Parnell. If you have any questions after today's visit, please call [phone].
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
## 2018-08-13 RX ORDER — TRAZODONE HYDROCHLORIDE 100 MG/1
100-200 TABLET ORAL
COMMUNITY
End: 2018-01-01 | Stop reason: SDUPTHER

## 2018-08-13 RX ORDER — ARIPIPRAZOLE 5 MG/1
5 TABLET ORAL
COMMUNITY
End: 2018-01-01 | Stop reason: SDUPTHER

## 2018-08-13 RX ORDER — OMEPRAZOLE 40 MG/1
40 CAPSULE, DELAYED RELEASE ORAL
COMMUNITY

## 2018-08-13 RX ORDER — GABAPENTIN 100 MG/1
100 CAPSULE ORAL 3 TIMES DAILY
COMMUNITY
End: 2018-01-01 | Stop reason: SDUPTHER

## 2018-08-13 RX ORDER — LAMOTRIGINE 100 MG/1
100 TABLET, EXTENDED RELEASE ORAL
COMMUNITY

## 2018-08-13 RX ORDER — SERTRALINE HYDROCHLORIDE 100 MG/1
150 TABLET, FILM COATED ORAL
COMMUNITY
End: 2018-01-01 | Stop reason: SDUPTHER

## 2018-08-14 ENCOUNTER — ANESTHESIA EVENT (OUTPATIENT)
Dept: ENDOSCOPY | Age: 38
End: 2018-08-14
Payer: MEDICAID

## 2018-08-14 ENCOUNTER — HOSPITAL ENCOUNTER (OUTPATIENT)
Age: 38
Setting detail: OUTPATIENT SURGERY
Discharge: HOME | End: 2018-08-14
Attending: SPECIALIST | Admitting: SPECIALIST
Payer: MEDICAID

## 2018-08-14 ENCOUNTER — ANESTHESIA (OUTPATIENT)
Dept: ENDOSCOPY | Age: 38
End: 2018-08-14
Payer: MEDICAID

## 2018-08-14 VITALS
TEMPERATURE: 98.2 F | DIASTOLIC BLOOD PRESSURE: 77 MMHG | BODY MASS INDEX: 19.14 KG/M2 | OXYGEN SATURATION: 98 % | WEIGHT: 126.31 LBS | SYSTOLIC BLOOD PRESSURE: 123 MMHG | HEIGHT: 68 IN | HEART RATE: 106 BPM | RESPIRATION RATE: 25 BRPM

## 2018-08-14 LAB
GLUCOSE BLD STRIP.AUTO-MCNC: 127 MG/DL (ref 65–100)
HCV AB SERPL QL IA: NONREACTIVE
HCV COMMENT,HCGAC: NORMAL
HIV 1+2 AB+HIV1 P24 AG SERPL QL IA: NONREACTIVE
HIV12 RESULT COMMENT, HHIVC: NORMAL
KOH PREP SPEC: NORMAL
SERVICE CMNT-IMP: ABNORMAL
SERVICE CMNT-IMP: NORMAL

## 2018-08-14 PROCEDURE — 77030009426 HC FCPS BIOP ENDOSC BSC -B: Performed by: SPECIALIST

## 2018-08-14 PROCEDURE — 87389 HIV-1 AG W/HIV-1&-2 AB AG IA: CPT | Performed by: SPECIALIST

## 2018-08-14 PROCEDURE — 77030036933 HC BRSH RX CYTO WREGD BSC -C: Performed by: SPECIALIST

## 2018-08-14 PROCEDURE — 76040000007: Performed by: SPECIALIST

## 2018-08-14 PROCEDURE — 82962 GLUCOSE BLOOD TEST: CPT

## 2018-08-14 PROCEDURE — 76060000032 HC ANESTHESIA 0.5 TO 1 HR: Performed by: SPECIALIST

## 2018-08-14 PROCEDURE — 88305 TISSUE EXAM BY PATHOLOGIST: CPT | Performed by: SPECIALIST

## 2018-08-14 PROCEDURE — 88312 SPECIAL STAINS GROUP 1: CPT | Performed by: SPECIALIST

## 2018-08-14 PROCEDURE — 74011250636 HC RX REV CODE- 250/636: Performed by: SPECIALIST

## 2018-08-14 PROCEDURE — 87210 SMEAR WET MOUNT SALINE/INK: CPT | Performed by: SPECIALIST

## 2018-08-14 PROCEDURE — 74011250636 HC RX REV CODE- 250/636

## 2018-08-14 PROCEDURE — 86803 HEPATITIS C AB TEST: CPT | Performed by: SPECIALIST

## 2018-08-14 PROCEDURE — 36415 COLL VENOUS BLD VENIPUNCTURE: CPT | Performed by: SPECIALIST

## 2018-08-14 RX ORDER — LORAZEPAM 2 MG/ML
2 INJECTION INTRAMUSCULAR AS NEEDED
Status: DISCONTINUED | OUTPATIENT
Start: 2018-08-14 | End: 2018-08-14 | Stop reason: HOSPADM

## 2018-08-14 RX ORDER — SODIUM CHLORIDE 0.9 % (FLUSH) 0.9 %
5-10 SYRINGE (ML) INJECTION AS NEEDED
Status: DISCONTINUED | OUTPATIENT
Start: 2018-08-14 | End: 2018-08-14 | Stop reason: HOSPADM

## 2018-08-14 RX ORDER — SODIUM CHLORIDE 0.9 % (FLUSH) 0.9 %
5-10 SYRINGE (ML) INJECTION EVERY 8 HOURS
Status: DISCONTINUED | OUTPATIENT
Start: 2018-08-14 | End: 2018-08-14 | Stop reason: HOSPADM

## 2018-08-14 RX ORDER — SODIUM CHLORIDE 9 MG/ML
50 INJECTION, SOLUTION INTRAVENOUS CONTINUOUS
Status: DISCONTINUED | OUTPATIENT
Start: 2018-08-14 | End: 2018-08-14 | Stop reason: HOSPADM

## 2018-08-14 RX ORDER — ATROPINE SULFATE 0.1 MG/ML
0.5 INJECTION INTRAVENOUS
Status: DISCONTINUED | OUTPATIENT
Start: 2018-08-14 | End: 2018-08-14 | Stop reason: HOSPADM

## 2018-08-14 RX ORDER — EPINEPHRINE 0.1 MG/ML
1 INJECTION INTRACARDIAC; INTRAVENOUS
Status: DISCONTINUED | OUTPATIENT
Start: 2018-08-14 | End: 2018-08-14 | Stop reason: HOSPADM

## 2018-08-14 RX ORDER — FLUMAZENIL 0.1 MG/ML
0.2 INJECTION INTRAVENOUS
Status: DISCONTINUED | OUTPATIENT
Start: 2018-08-14 | End: 2018-08-14 | Stop reason: HOSPADM

## 2018-08-14 RX ORDER — SODIUM CHLORIDE 9 MG/ML
INJECTION, SOLUTION INTRAVENOUS
Status: DISCONTINUED | OUTPATIENT
Start: 2018-08-14 | End: 2018-08-14 | Stop reason: HOSPADM

## 2018-08-14 RX ORDER — DEXTROMETHORPHAN/PSEUDOEPHED 2.5-7.5/.8
1.2 DROPS ORAL
Status: DISCONTINUED | OUTPATIENT
Start: 2018-08-14 | End: 2018-08-14 | Stop reason: HOSPADM

## 2018-08-14 RX ORDER — FENTANYL CITRATE 50 UG/ML
200 INJECTION, SOLUTION INTRAMUSCULAR; INTRAVENOUS
Status: DISCONTINUED | OUTPATIENT
Start: 2018-08-14 | End: 2018-08-14 | Stop reason: HOSPADM

## 2018-08-14 RX ORDER — MIDAZOLAM HYDROCHLORIDE 1 MG/ML
.25-1 INJECTION, SOLUTION INTRAMUSCULAR; INTRAVENOUS
Status: DISCONTINUED | OUTPATIENT
Start: 2018-08-14 | End: 2018-08-14 | Stop reason: HOSPADM

## 2018-08-14 RX ORDER — NALOXONE HYDROCHLORIDE 0.4 MG/ML
0.4 INJECTION, SOLUTION INTRAMUSCULAR; INTRAVENOUS; SUBCUTANEOUS
Status: DISCONTINUED | OUTPATIENT
Start: 2018-08-14 | End: 2018-08-14 | Stop reason: HOSPADM

## 2018-08-14 RX ORDER — PROPOFOL 10 MG/ML
INJECTION, EMULSION INTRAVENOUS AS NEEDED
Status: DISCONTINUED | OUTPATIENT
Start: 2018-08-14 | End: 2018-08-14 | Stop reason: HOSPADM

## 2018-08-14 RX ADMIN — PROPOFOL 200 MG: 10 INJECTION, EMULSION INTRAVENOUS at 12:44

## 2018-08-14 RX ADMIN — SODIUM CHLORIDE: 9 INJECTION, SOLUTION INTRAVENOUS at 12:42

## 2018-08-14 RX ADMIN — PROPOFOL 50 MG: 10 INJECTION, EMULSION INTRAVENOUS at 12:45

## 2018-08-14 RX ADMIN — PROPOFOL 50 MG: 10 INJECTION, EMULSION INTRAVENOUS at 12:58

## 2018-08-14 RX ADMIN — PROPOFOL 50 MG: 10 INJECTION, EMULSION INTRAVENOUS at 12:46

## 2018-08-14 RX ADMIN — PROPOFOL 50 MG: 10 INJECTION, EMULSION INTRAVENOUS at 13:06

## 2018-08-14 RX ADMIN — PROPOFOL 50 MG: 10 INJECTION, EMULSION INTRAVENOUS at 13:02

## 2018-08-14 RX ADMIN — PROPOFOL 50 MG: 10 INJECTION, EMULSION INTRAVENOUS at 12:52

## 2018-08-14 RX ADMIN — PROPOFOL 50 MG: 10 INJECTION, EMULSION INTRAVENOUS at 12:48

## 2018-08-14 RX ADMIN — PROPOFOL 50 MG: 10 INJECTION, EMULSION INTRAVENOUS at 13:08

## 2018-08-14 RX ADMIN — PROPOFOL 50 MG: 10 INJECTION, EMULSION INTRAVENOUS at 12:54

## 2018-08-14 RX ADMIN — PROPOFOL 50 MG: 10 INJECTION, EMULSION INTRAVENOUS at 12:50

## 2018-08-14 RX ADMIN — PROPOFOL 50 MG: 10 INJECTION, EMULSION INTRAVENOUS at 12:56

## 2018-08-14 RX ADMIN — PROPOFOL 50 MG: 10 INJECTION, EMULSION INTRAVENOUS at 13:04

## 2018-08-14 RX ADMIN — PROPOFOL 50 MG: 10 INJECTION, EMULSION INTRAVENOUS at 13:00

## 2018-08-14 NOTE — DISCHARGE INSTRUCTIONS
Gadiel Vanegas  [id number]  1980    EGD DISCHARGE INSTRUCTIONS  Discomfort:  Sore throat- throat lozenges or warm salt water gargle  redness at IV site- apply warm compress to area; if redness or soreness persist- contact your physician  Gaseous discomfort- walking, belching will help relieve any discomfort  You may not operate a vehicle for 12 hours  You may not engage in an occupation involving machinery or appliances for rest of today. You may not drink alcoholic beverages for at least 12 hours  Avoid making any critical decisions for at least 24 hour  DIET  You may resume your regular diet - however -  remember your colon is empty and a heavy meal will produce gas. Avoid these foods:  vegetables, fried / greasy foods, carbonated drinks  ACTIVITY  You may resume your normal daily activities. Spend the remainder of the day resting -  avoid any strenuous activity. CALL M.D. ANY SIGN OF   Increasing pain, nausea, vomiting  Abdominal distension (swelling)  New increased bleeding (oral or rectal)  Fever (chills)  Pain in chest area  Bloody discharge from nose or mouth  Shortness of breath    You may not not take any Advil, Aspirin, Ibuprofen, Motrin, Aleve, or Goodys , ONLY  Tylenol as needed for pain.     Follow-up Instructions:   Call Dr. Jaquelyn Carrel office to make appointment for repeat EGD in 8 weeks  Office telephone for problems or questions [phone]  Results of procedure / biopsy in 10-14 days   Scripts written for increase in omeprazole and unable to treat fungal infection because meds interfere with other meds

## 2018-08-14 NOTE — ANESTHESIA PREPROCEDURE EVALUATION
Anesthetic History   No history of anesthetic complications            Review of Systems / Medical History  Patient summary reviewed, nursing notes reviewed and pertinent labs reviewed    Pulmonary    COPD: severe        Asthma     Comments: 2L home O2   Neuro/Psych     seizures         Cardiovascular    Hypertension              Exercise tolerance: <4 METS     GI/Hepatic/Renal     GERD: well controlled           Endo/Other    Diabetes: well controlled, type 2         Other Findings   Comments: Polysubstance abuse         Physical Exam    Airway  Mallampati: II  TM Distance: > 6 cm  Neck ROM: normal range of motion   Mouth opening: Normal     Cardiovascular  Regular rate and rhythm,  S1 and S2 normal,  no murmur, click, rub, or gallop             Dental    Dentition: Edentulous     Pulmonary  Breath sounds clear to auscultation               Abdominal  GI exam deferred       Other Findings            Anesthetic Plan    ASA: 3  Anesthesia type: MAC          Induction: Intravenous  Anesthetic plan and risks discussed with: Patient

## 2018-08-14 NOTE — IP AVS SNAPSHOT
[address] [facility] [address] [place] 
[phone] Patient: Que Lai MRN: [id number] FQO:[id number] About your hospitalization You were admitted on:  August 14, 2018 You last received care in the:  [facility] ENDOSCOPY You were discharged on:  August 14, 2018 Why you were hospitalized Your primary diagnosis was:  Not on File Follow-up Information None Your Scheduled Appointments Friday August 24, 2018  9:00 AM EDT  
DIABETES CLASS 3HR with DIABETES CLASS #1 [facility] DIABETIC TREATMENT ([address]) [address] [facility] [address] [place]  
[phone] Thursday August 30, 2018 12:30 PM EDT Any with Vitaliy Castillo MD  
[address] [place] [address] and Primary Care [address] [place] [address]) [address] [id number]  
[phone] Friday September 14, 2018  9:00 AM EDT  
ESTABLISHED PATIENT with Gladys Mckeon NP [facility] ([address] [place] [address]) [address] [facility] [address] [place] [address]  
[phone] Discharge Orders None A check erica indicates which time of day the medication should be taken. My Medications CONTINUE taking these medications Instructions Each Dose to Equal  
 Morning Noon Evening Bedtime ABILIFY 5 mg tablet Generic drug:  ARIPiprazole Your last dose was: Your next dose is: Take 5 mg by mouth nightly. 5 mg * albuterol 2.5 mg /3 mL (0.083 %) nebulizer solution Commonly known as:  PROVENTIL VENTOLIN Your last dose was: Your next dose is:    
   
   
 3 mL by Nebulization route every four (4) hours as needed for Wheezing. 2.5 mg  
    
   
   
   
  
 * albuterol 90 mcg/actuation inhaler Commonly known as:  PROVENTIL HFA, VENTOLIN HFA, PROAIR HFA Your last dose was: Your next dose is: Take 2 Puffs by inhalation every four (4) hours as needed for Wheezing. 2 Puff  
    
   
   
   
  
 amLODIPine 5 mg tablet Commonly known as:  Cori Song Your last dose was: Your next dose is:    
   
   
 TK 1 T PO QD  
     
   
   
   
  
 gabapentin 100 mg capsule Commonly known as:  NEURONTIN Your last dose was: Your next dose is: Take 100 mg by mouth three (3) times daily. 100 mg  
    
   
   
   
  
 lamoTRIgine 100 mg Tr24 ER tablet Commonly known as: LaMICtal XR Your last dose was: Your next dose is: Take 100 mg by mouth nightly. 100 mg  
    
   
   
   
  
 meloxicam 15 mg tablet Commonly known as:  MOBIC Your last dose was: Your next dose is: Take 1 Tab by mouth daily. 15 mg  
    
   
   
   
  
 metFORMIN  mg tablet Commonly known as:  GLUCOPHAGE XR Your last dose was: Your next dose is: Take 1 Tab by mouth daily (with dinner). 500 mg  
    
   
   
   
  
 omeprazole 40 mg capsule Commonly known as:  PRILOSEC Your last dose was: Your next dose is: Take 40 mg by mouth Daily (before breakfast). 40 mg Oxygen Your last dose was: Your next dose is:    
   
   
 2 liter oxygen 24hr/day  
     
   
   
   
  
 prazosin 2 mg capsule Commonly known as:  MINIPRESS Your last dose was: Your next dose is: Take 1 Cap by mouth nightly. 2 mg SYMBICORT 160-4.5 mcg/actuation Hfaa Generic drug:  budesonide-formoterol Your last dose was: Your next dose is: Take 2 Puffs by inhalation two (2) times a day. 2 Puff  
    
   
   
   
  
 traZODone 100 mg tablet Commonly known as:  Sandee Janneth Your last dose was: Your next dose is: Take 100-200 mg by mouth nightly.   
 100-200 mg  
    
   
   
   
 ZOLOFT 100 mg tablet Generic drug:  sertraline Your last dose was: Your next dose is: Take 150 mg by mouth nightly. 150 mg  
    
   
   
   
  
 * Notice: This list has 2 medication(s) that are the same as other medications prescribed for you. Read the directions carefully, and ask your doctor or other care provider to review them with you. Discharge Instructions Gerald Child [id number] 1980 EGD DISCHARGE INSTRUCTIONS Discomfort: 
Sore throat- throat lozenges or warm salt water gargle 
redness at IV site- apply warm compress to area; if redness or soreness persist- contact your physician Gaseous discomfort- walking, belching will help relieve any discomfort You may not operate a vehicle for 12 hours You may not engage in an occupation involving machinery or appliances for rest of today. You may not drink alcoholic beverages for at least 12 hours Avoid making any critical decisions for at least 24 hour DIET You may resume your regular diet  however -  remember your colon is empty and a heavy meal will produce gas. Avoid these foods:  vegetables, fried / greasy foods, carbonated drinks ACTIVITY You may resume your normal daily activities. Spend the remainder of the day resting -  avoid any strenuous activity. CALL M.D. ANY SIGN OF Increasing pain, nausea, vomiting Abdominal distension (swelling) New increased bleeding (oral or rectal) Fever (chills) Pain in chest area Bloody discharge from nose or mouth Shortness of breath You may not not take any Advil, Aspirin, Ibuprofen, Motrin, Aleve, or Goodys , ONLY  Tylenol as needed for pain. Follow-up Instructions: 
 Call Dr. John Workman office to make appointment for repeat EGD in 8 weeks Office telephone for problems or questions [phone] Results of procedure / biopsy in 10-14 days Scripts written for increase in omeprazole and unable to treat fungal infection because meds interfere with other meds Introducing [facility]! [employer] introduces [employer] patient portal. Now you can access parts of your medical record, email your doctor's office, and request medication refills online. 1. In your internet browser, go to [email or web address] [employer]/"[employer]t 2. Click on the First Time User? Click Here link in the Sign In box. You will see the New Member Sign Up page. 3. Enter your [employer] Access Code exactly as it appears below. You will not need to use this code after youve completed the sign-up process. If you do not sign up before the expiration date, you must request a new code. · [employer] Access Code: [email or web address] Expires: 11/12/2018  2:03 PM 
 
4. Enter the last four digits of your Social Security Number (xxxx) and Date of Birth (mm/dd/yyyy) as indicated and click Submit. You will be taken to the next sign-up page. 5. Create a MarketMeSuite ID. This will be your [employer] login ID and cannot be changed, so think of one that is secure and easy to remember. 6. Create a [employer] password. You can change your password at any time. 7. Enter your Password Reset Question and Answer. This can be used at a later time if you forget your password. 8. Enter your e-mail address. You will receive e-mail notification when new information is available in [address] 9. Click Sign Up. You can now view and download portions of your medical record. 10. Click the Download Summary menu link to download a portable copy of your medical information. If you have questions, please visit the Frequently Asked Questions section of the [employer] website. Remember, [employer] is NOT to be used for urgent needs. For medical emergencies, dial 911. Now available from your iPhone and Android! Introducing Yann Stubbs As a [employer] patient, I wanted to make you aware of our electronic visit tool called Yann Stubbs. [employer] allows you to connect within minutes with a medical provider 24 hours a day, seven days a week via a mobile device or tablet or logging into a secure website from your computer. You can access [place] from anywhere in the [place]. A virtual visit might be right for you when you have a simple condition and feel like you just dont want to get out of bed, or cant get away from work for an appointment, when your regular [employer] provider is not available (evenings, weekends or holidays), or when youre out of town and need minor care. Electronic visits cost only $49 and if the [employer] provider determines a prescription is needed to treat your condition, one can be electronically transmitted to a nearby pharmacy*. Please take a moment to enroll today if you have not already done so. The enrollment process is free and takes just a few minutes. To enroll, please download the [employer] reid to your tablet or phone, or visit [email or web address] to enroll on your computer. And, as an [address] [place] [address] patient with a [employer] account, the results of your visits will be scanned into your electronic medical record and your primary care provider will be able to view the scanned results. We urge you to continue to see your regular [employer] provider for your ongoing medical care. And while your primary care provider may not be the one available when you seek a [employer] virtual visit, the peace of mind you get from getting a real diagnosis real time can be priceless. For more information on [employer], view our Frequently Asked Questions (FAQs) at [email or web address]. Sincerely, 
 
Ehsan Cote MD 
Chief Medical Officer Lewis Valadez *:  certain medications cannot be prescribed via [employer] Providers Seen During Your Hospitalization Provider Specialty Primary office phone Raheel Baker MD Gastroenterology [phone] Your Primary Care Physician (PCP) Primary Care Physician Office Phone Office Fax Emely Franco [phone] You are allergic to the following Allergen Reactions Codeine Other (comments) Remeron (Mirtazapine) Other (comments)  
 ken Seroquel (Quetiapine) Other (comments) tremors Recent Documentation Height Weight BMI Smoking Status 1.727 m 57.3 kg 19.21 kg/m2 Current Every Day Smoker Emergency Contacts Name Discharge Info Relation Home Work Mobile Ira Almanzar DISCHARGE CAREGIVER [3] Spouse [3] [phone] Ira Guerra  Spouse [3] [phone] Patient Belongings The following personal items are in your possession at time of discharge: 
  Dental Appliances: None  Visual Aid: None Please provide this summary of care documentation to your next provider. Signatures-by signing, you are acknowledging that this After Visit Summary has been reviewed with you and you have received a copy. Patient Signature:  ____________________________________________________________ Date:  ____________________________________________________________  
  
Justyn Vieira Provider Signature:  ____________________________________________________________ Date:  ____________________________________________________________

## 2018-08-14 NOTE — ANESTHESIA POSTPROCEDURE EVALUATION
Post-Anesthesia Evaluation and Assessment    Patient: Amee Jin MRN: [id number]  SSN: [id number]    YOB: 1980  Age: 42 y.o. Sex: male       Cardiovascular Function/Vital Signs  Visit Vitals    /77    Pulse 75    Temp 36.8 °C (98.2 °F)    Resp 23    Ht 5' 8\" (1.727 m)    Wt 57.3 kg (126 lb 5 oz)    SpO2 100%    BMI 19.21 kg/m2       Patient is status post MAC anesthesia for Procedure(s):  ESOPHAGOGASTRODUODENOSCOPY (EGD)  ESOPHAGOGASTRODUODENAL (EGD) BIOPSY  ENDOSCOPIC BRUSHING. Nausea/Vomiting: None    Postoperative hydration reviewed and adequate. Pain:  Pain Scale 1: Adult Nonverbal Pain Scale (08/14/18 1319)  Pain Intensity 1: 0 (08/14/18 1319)   Managed    Neurological Status: At baseline    Mental Status and Level of Consciousness: Arousable    Pulmonary Status:   O2 Device: Nasal cannula (08/14/18 1341)   Adequate oxygenation and airway patent    Complications related to anesthesia: None    Post-anesthesia assessment completed.  No concerns    Signed By: Neetu Tracy MD     August 14, 2018

## 2018-08-14 NOTE — ROUTINE PROCESS
Amee Jin  1980  [id number]    Situation:  Verbal report received from: Marilyn Dejesus RN  Procedure: Procedure(s):  ESOPHAGOGASTRODUODENOSCOPY (EGD)  ESOPHAGOGASTRODUODENAL (EGD) BIOPSY  ENDOSCOPIC BRUSHING    Background:    Preoperative diagnosis: EXCESSIVE WEIGHT, GERD, DYSPHAGIA  Postoperative diagnosis: CANDIDA ESOPHAGITIS, LONG--SEGMENT BARRETTS, HIATAL HERNIA    :  Dr. Marysol Mtz  Assistant(s): Endoscopy Technician-1: Lachelle Marley  Endoscopy RN-1: Barry Dotson RN    Specimens:   ID Type Source Tests Collected by Time Destination   1 : ESOPHAGUS AT [address], MD 8/14/2018 1250 Pathology   2 : ESOPHAGUS AT [address], MD 8/14/2018 1254 Pathology   3 : ESOPHAGUS AT [address], MD 8/14/2018 1255 Pathology   4 : ESOPHAGUS AT 33CM---1 Preservative   Pearl Simmons MD 8/14/2018 1258 Pathology   5 : ESOPHAGUS AT 33CM---2 Preservative   Pearl Simmons MD 8/14/2018 1300 Pathology   6 : ESOPHAGUS AT [address], MD 8/14/2018 1303 Pathology   7 : ESOPHAGUS AT 34 CM Preservative   Pearl Simmons MD 8/14/2018 1305 Pathology   8 : ESOPHAGUS AT 27 CM Preservative   Pearl Simmons MD 8/14/2018 1308 Pathology   1 :  Brushing Esophageal Brushing  Pearl Simmons MD 8/14/2018 1253 Microbiology     H. Pylori  no    Assessment:  Intra-procedure medications   Anesthesia gave intra-procedure sedation and medications, see anesthesia flow sheet yes    Intravenous fluids: NS@ KVO     Vital signs stable     Abdominal assessment: round and soft     Recommendation:  Discharge patient per MD order.     Family or Friend   Permission to share finding with family or friend yes
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
## 2018-08-14 NOTE — PROCEDURES
EGD Procedure Note    Indications:  Abnormal weight loss, Dysphagia/odynophagia, GERD, Heartburn, persistent despite therapy, cigarette abuse, COPD and weight loss. Questionable US duodenal mass of the 2nd part of duodenum not demonstrated of CT scan, mild anemia   Referring Physician: Miguel Segovia MD   Anesthesia/Sedation:MAC  Endoscopist:  Dr. Claribel Carreon  Assistant:  Endoscopy Technician-1: Romana Prow  Endoscopy RN-1: Jurgen Young RN    Preoperative diagnosis: EXCESSIVE WEIGHT, GERD, DYSPHAGIA, ANEMIA    Postoperative diagnosis: CANDIDA ESOPHAGITIS, LONG--SEGMENT BARRETTS, HIATAL HERNIA      Procedure in Detail:  Informed consent was obtained for the procedure, including sedation. Risks of perforation, hemorrhage, adverse drug reaction, and aspiration were discussed. The patient was placed in the left lateral decubitus position. Based on the pre-procedure assessment, including review of the patient's medical history, medications, allergies, and review of systems, he had been deemed to be an appropriate candidate for moderate sedation; he was therefore sedated with the medications listed above. The patient was monitored continuously with ECG tracing, pulse oximetry, blood pressure monitoring, and direct observations. An Olympus video endoscope was inserted into the mouth and advanced under direct vision to into the esophagus, then stomach and duodenum. A careful inspection was made as the gastroscope was withdrawn, including a retroflexed view of the proximal stomach; findings and interventions are described below. Findings:   Esophagus:Small sliding 2 cm hiatal hernia with long 10-11 cm segment of salmon colored mucosa(persumed Coley's) 4 quadrant bx's every 2 cm from 39 cm to 27 cm (exact site may be off by 1-2 cm because of sliding hiatal hernia)obtained at 18-41-23-33-29-27 cm.  Also noted hard 2 mm erosion bet 35-33 and this Bx; also some narrowing at 35 cm at 27 and proximally there was esophageal mucosa and mild to moderate candida which was brushed for KOH  Stomach: normal   Duodenum/jejunum: normal, no mass seen    Therapies:  See above    Specimens: See above           EBL: None    Complications:   None; patient tolerated the procedure well.            Recommendations:  -Increase acid suppression with a proton pump inhibitor to omeprazole 40 mg BID  -Candidiasis related to diabetes and steroid inhaler but check HIV and HCV due to Hx of drug abuse  -Current medications he is taking preclude use of antifungals due to reported problems with arrhythmia when used concomitantly  -Await path plan to repeat in 8 weeks, appearance of esophagus has me concerned about esophageal dysplasia   Radha Petty MD

## 2018-08-30 ENCOUNTER — OFFICE VISIT (OUTPATIENT)
Dept: INTERNAL MEDICINE CLINIC | Age: 38
End: 2018-08-30

## 2018-08-30 VITALS
HEIGHT: 68 IN | HEART RATE: 97 BPM | BODY MASS INDEX: 19.67 KG/M2 | DIASTOLIC BLOOD PRESSURE: 88 MMHG | SYSTOLIC BLOOD PRESSURE: 123 MMHG | TEMPERATURE: 97.6 F | OXYGEN SATURATION: 95 % | WEIGHT: 129.8 LBS | RESPIRATION RATE: 20 BRPM

## 2018-08-30 DIAGNOSIS — E11.9 TYPE 2 DIABETES MELLITUS WITHOUT COMPLICATION, WITHOUT LONG-TERM CURRENT USE OF INSULIN (HCC): Primary | ICD-10-CM

## 2018-08-30 DIAGNOSIS — R56.9 SEIZURES (HCC): ICD-10-CM

## 2018-08-30 DIAGNOSIS — J44.9 COPD WITH ASTHMA (HCC): ICD-10-CM

## 2018-08-30 NOTE — PROGRESS NOTES
Chief Complaint   Patient presents with    Diabetes    COPD       1. Have you been to the ER, urgent care clinic since your last visit? Hospitalized since your last visit? No    2. Have you seen or consulted any other health care providers outside of the [address] [place] [address] since your last visit? Include any pap smears or colon screening. No    Body mass index is 19.74 kg/(m^2).
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
## 2018-08-30 NOTE — MR AVS SNAPSHOT
[address] [facility] [address] 
[phone] Patient: Judah Barker MRN: [id number]:[id number] Visit Information Date & Time Provider Department Dept. Phone Encounter #  
 8/30/2018 12:30 PM Danielle Orozco MD [facility] Medicine [facility] Primary [facility] [phone] [id number] Follow-up Instructions Return in about 3 months (around 11/30/2018). Follow-up and Disposition History Your Appointments 9/14/2018  9:00 AM  
ESTABLISHED PATIENT with Drew Arellano NP [facility] Appt Note: 4 week follow up [address] [facility] [address] [facility] [address]  
[phone]  
  
   
 [address] [place] [address] [phone]  
  
    
 11/9/2018  2:40 PM  
Follow Up with Sabina Ocampo DO [facility] Appt Note: f/u seizures/EAH  
 [address] [facility] [address]  
[phone]  
  
   
 [address] [address] Upcoming Health Maintenance Date Due MICROALBUMIN Q1 9/16/1988 EYE EXAM RETINAL OR DILATED Q1 9/16/1988 Influenza Age 5 to Adult 8/1/2018 Pneumococcal 19-64 Medium Risk (1 of 1 - PPSV23) 5/16/2019* DTaP/Tdap/Td series (1 - Tdap) 5/16/2019* HEMOGLOBIN A1C Q6M 11/16/2018 LIPID PANEL Q1 5/16/2019 FOOT EXAM Q1 6/27/2019 *Topic was postponed. The date shown is not the original due date. Allergies as of 8/30/2018  Review Complete On: 8/30/2018 By: Danielle Orozco MD  
  
 Severity Noted Reaction Type Reactions Codeine  01/05/2011    Other (comments) Remeron [Mirtazapine]  06/27/2018    Other (comments)  
 jerks Seroquel [Quetiapine]  01/05/2011    Other (comments) tremors Current Immunizations  Reviewed on 1/22/2013 No immunizations on file. Not reviewed this visit You Were Diagnosed With   
  
 Codes Comments Type 2 diabetes mellitus without complication, without long-term current use of insulin ([facility])    -  Primary ICD-10-CM: E11.9 ICD-9-CM: 250.00 COPD with asthma ([facility]     ICD-10-CM: J44.9 ICD-9-CM: 493.20 Seizures ([facility]     ICD-10-CM: R56.9 ICD-9-CM: 780.39 Vitals BP Pulse Temp Resp Height(growth percentile) Weight(growth percentile) 123/88 (BP 1 Location: Left arm, BP Patient Position: Sitting) 97 97.6 °F (36.4 °C) (Oral) 20 5' 8\" (1.727 m) 129 lb 12.8 oz (58.9 kg) SpO2 BMI Smoking Status 95% 19.74 kg/m2 Current Every Day Smoker Vitals History BMI and BSA Data Body Mass Index Body Surface Area 19.74 kg/m 2 1.68 m 2 Preferred Pharmacy Pharmacy Name Phone [place] [address] [address] [phone] Your Updated Medication List  
  
   
This list is accurate as of 8/30/18  2:21 PM.  Always use your most recent med list.  
  
  
  
  
 ABILIFY 5 mg tablet Generic drug:  ARIPiprazole Take 5 mg by mouth nightly. * albuterol 2.5 mg /3 mL (0.083 %) nebulizer solution Commonly known as:  PROVENTIL VENTOLIN  
3 mL by Nebulization route every four (4) hours as needed for Wheezing. * albuterol 90 mcg/actuation inhaler Commonly known as:  PROVENTIL HFA, VENTOLIN HFA, PROAIR HFA Take 2 Puffs by inhalation every four (4) hours as needed for Wheezing. amLODIPine 5 mg tablet Commonly known as:  [place] TK 1 T PO QD  
  
 gabapentin 100 mg capsule Commonly known as:  NEURONTIN Take 100 mg by mouth three (3) times daily. lamoTRIgine 100 mg Tr24 ER tablet Commonly known as: LaMICtal XR Take 100 mg by mouth nightly. meloxicam 15 mg tablet Commonly known as:  MOBIC Take 1 Tab by mouth daily. metFORMIN  mg tablet Commonly known as:  GLUCOPHAGE XR Take 1 Tab by mouth daily (with dinner). omeprazole 40 mg capsule Commonly known as:  PRILOSEC Take 40 mg by mouth Daily (before breakfast). Oxygen 2 liter oxygen 24hr/day  
  
 prazosin 2 mg capsule Commonly known as:  MINIPRESS Take 1 Cap by mouth nightly. SYMBICORT 160-4.5 mcg/actuation Hfaa Generic drug:  budesonide-formoterol Take 2 Puffs by inhalation two (2) times a day. traZODone 100 mg tablet Commonly known as:  García Person Take 100-200 mg by mouth nightly. ZOLOFT 100 mg tablet Generic drug:  sertraline Take 150 mg by mouth nightly. * Notice: This list has 2 medication(s) that are the same as other medications prescribed for you. Read the directions carefully, and ask your doctor or other care provider to review them with you. We Performed the Following COLLECTION VENOUS BLOOD,VENIPUNCTURE M2201597 CPT(R)] HEMOGLOBIN A1C WITH EAG [87057 CPT(R)] Follow-up Instructions Return in about 3 months (around 11/30/2018). Introducing [facility]! Raine Gross introduces [employer] patient portal. Now you can access parts of your medical record, email your doctor's office, and request medication refills online. 1. In your internet browser, go to [email or web address] 2. Click on the First Time User? Click Here link in the Sign In box. You will see the New Member Sign Up page. 3. Enter your [employer] Access Code exactly as it appears below. You will not need to use this code after youve completed the sign-up process. If you do not sign up before the expiration date, you must request a new code. · [employer] Access Code: [email or web address] Expires: 11/12/2018  2:03 PM 
 
4. Enter the last four digits of your Social Security Number (xxxx) and Date of Birth (mm/dd/yyyy) as indicated and click Submit. You will be taken to the next sign-up page. 5. Create a [employer] ID.  This will be your [employer] login ID and cannot be changed, so think of one that is secure and easy to remember. 6. Create a [employer] password. You can change your password at any time. 7. Enter your Password Reset Question and Answer. This can be used at a later time if you forget your password. 8. Enter your e-mail address. You will receive e-mail notification when new information is available in [address] 9. Click Sign Up. You can now view and download portions of your medical record. 10. Click the Download Summary menu link to download a portable copy of your medical information. If you have questions, please visit the Frequently Asked Questions section of the [employer] website. Remember, [employer] is NOT to be used for urgent needs. For medical emergencies, dial 911. Now available from your iPhone and Android! Please provide this summary of care documentation to your next provider. Your primary care clinician is listed as Dawit Hennessy. If you have any questions after today's visit, please call [phone].

## 2018-08-30 NOTE — PROGRESS NOTES
Patient returns today with known history of bipolar disorder, cocaine abuse and initially diabetes mellitus type 2, primary hypertension, schizophrenia, and is seen for evaluation.

## 2018-08-31 LAB
EST. AVERAGE GLUCOSE BLD GHB EST-MCNC: 137 MG/DL
HBA1C MFR BLD: 6.4 % (ref 4.8–5.6)

## 2018-09-21 NOTE — MR AVS SNAPSHOT
[facility] 
 
 
 [facility] [address] 1400 [address] [place] [address] 
[phone] Patient: Judah Barker MRN: [id number] RET:1/34/[birth year]31 Visit Information Date & Time Provider Department Dept. Phone Encounter #  
 9/21/2018 11:30 AM [facility] [phone] [id number] Follow-up Instructions Return in about 3 months (around 12/21/2018) for med check and follow up. Follow-up and Disposition History Your Appointments 11/9/2018  2:40 PM  
Follow Up with [facility] at [address]) Appt Note: f/u seizures/EAH  
 [facility] [address]  
[phone]  
  
   
 [address] [facility] [address]  
  
    
 11/19/2018  3:45 PM  
Any with Danielle Orozco MD  
[address] [place] [address] and Primary Care [address] [place] [address]) Appt Note: 3 Month Follow Up; R/S TR 9/19/18  
 [address] [facility]  
  
   
 [address] [id number]  
  
    
 12/21/2018  9:00 AM  
ESTABLISHED PATIENT with Drew Arellano NP [facility] ([address] [place] [address]) Appt Note: 3 month follow up [address] Kaycee Alejo [address]  
  
   
 [address] 101 [facility] 7 [phone] Upcoming Health Maintenance Date Due MICROALBUMIN Q1 9/16/1988 Influenza Age 5 to Adult 8/1/2018 Pneumococcal 19-64 Medium Risk (1 of 1 - PPSV23) 5/16/2019* DTaP/Tdap/Td series (1 - Tdap) 5/16/2019* HEMOGLOBIN A1C Q6M 2/28/2019 LIPID PANEL Q1 5/16/2019 FOOT EXAM Q1 6/27/2019 EYE EXAM RETINAL OR DILATED Q1 8/20/2019 *Topic was postponed. The date shown is not the original due date. Allergies as of 9/21/2018  Review Complete On: 9/21/2018 By: Drew Arellano NP Severity Noted Reaction Type Reactions Codeine  01/05/2011    Other (comments) Remeron [Mirtazapine]  06/27/2018    Other (comments)  
 ken Seroquel [Quetiapine]  01/05/2011    Other (comments) tremors Current Immunizations  Reviewed on 1/22/2013 No immunizations on file. Not reviewed this visit You Were Diagnosed With   
  
 Codes Comments Substance or medication-induced psychotic disorder, with hallucinations (RUST 75.)    -  Primary ICD-10-CM: B22.882 ICD-9-CM: 292.12, 305.90 Substance induced mood disorder (HCC)     ICD-10-CM: B98.78 ICD-9-CM: 292.84 PTSD (post-traumatic stress disorder)     ICD-10-CM: F43.10 ICD-9-CM: 309.81 Depression, major, recurrent, moderate (RUST 75.)     ICD-10-CM: F33.1 ICD-9-CM: 296.32 Insomnia, unspecified type     ICD-10-CM: G47.00 ICD-9-CM: 780.52 Anxiety     ICD-10-CM: F41.9 ICD-9-CM: 300.00 Vitals BP Pulse Temp Resp Height(growth percentile) Weight(growth percentile) 140/85 (BP 1 Location: Left arm, BP Patient Position: Sitting) 91 98.5 °F (36.9 °C) (Oral) 20 5' 8\" (1.727 m) 126 lb 3.2 oz (57.2 kg) SpO2 BMI Smoking Status 96% 19.19 kg/m2 Current Every Day Smoker Vitals History BMI and BSA Data Body Mass Index Body Surface Area  
 19.19 kg/m 2 1.66 m 2 Preferred Pharmacy Pharmacy Name [facility]  [address], [address] [place] [address] [phone] Your Updated Medication List  
  
   
This list is accurate as of 9/21/18 12:02 PM.  Always use your most recent med list.  
  
  
  
  
 * albuterol 2.5 mg /3 mL (0.083 %) nebulizer solution Commonly known as:  PROVENTIL VENTOLIN  
3 mL by Nebulization route every four (4) hours as needed for Wheezing. * albuterol 90 mcg/actuation inhaler Commonly known as:  PROVENTIL HFA, VENTOLIN HFA, PROAIR HFA Take 2 Puffs by inhalation every four (4) hours as needed for Wheezing. amLODIPine 5 mg tablet Commonly known as:  Shea Caballerouce TK 1 T PO QD  
  
 ARIPiprazole 5 mg tablet Commonly known as:  ABILIFY TAKE 1 TABLET BY MOUTH DAILY  
  
 gabapentin 100 mg capsule Commonly known as:  NEURONTIN Take 1 Cap by mouth three (3) times daily. lamoTRIgine 100 mg Tr24 ER tablet Commonly known as: LaMICtal XR Take 100 mg by mouth nightly. meloxicam 15 mg tablet Commonly known as:  MOBIC Take 1 Tab by mouth daily. metFORMIN  mg tablet Commonly known as:  GLUCOPHAGE XR Take 1 Tab by mouth daily (with dinner). omeprazole 40 mg capsule Commonly known as:  PRILOSEC Take 40 mg by mouth Daily (before breakfast). Oxygen 2 liter oxygen 24hr/day  
  
 prazosin 2 mg capsule Commonly known as:  MINIPRESS Take 1 Cap by mouth nightly. sertraline 100 mg tablet Commonly known as:  ZOLOFT Take 1.5 Tabs by mouth nightly. SPIRIVA WITH HANDIHALER 18 mcg inhalation capsule Generic drug:  tiotropium SYMBICORT 160-4.5 mcg/actuation Hfaa Generic drug:  budesonide-formoterol Take 2 Puffs by inhalation two (2) times a day. traZODone 100 mg tablet Commonly known as:  [place] Arroyo Take 1-2 Tabs by mouth nightly. * Notice: This list has 2 medication(s) that are the same as other medications prescribed for you. Read the directions carefully, and ask your doctor or other care provider to review them with you. Prescriptions Sent to Pharmacy Refills ARIPiprazole (ABILIFY) 5 mg tablet 0 Sig: TAKE 1 TABLET BY MOUTH DAILY Class: Normal  
 Pharmacy: [facility] [address] [place] [address] [address] AT [facility] Ph #: [phone]  
 prazosin (MINIPRESS) 2 mg capsule 0 Sig: Take 1 Cap by mouth nightly. Class: Normal  
 Pharmacy: [facility] [address] [facility] [address] Ph #: [phone]  Route: Oral  
 sertraline (ZOLOFT) 100 mg tablet 0  
 Sig: Take 1.5 Tabs by mouth nightly. Class: Normal  
 Pharmacy: [facility] [address] [place] [address], [address] [place] [address] Ph #: [phone] Route: Oral  
 traZODone (DESYREL) 100 mg tablet 0 Sig: Take 1-2 Tabs by mouth nightly. Class: Normal  
 Pharmacy: [facility] [address] [place] [address], [address] [place] [address] Ph #: [phone] Route: Oral  
 gabapentin (NEURONTIN) 100 mg capsule 3 Sig: Take 1 Cap by mouth three (3) times daily. Class: Normal  
 Pharmacy: [facility] [address] [place] [address] Ph #: [phone] Route: Oral  
  
Follow-up Instructions Return in about 3 months (around 12/21/2018) for med check and follow up. Introducing [facility]! [employer] introduces [employer] patient portal. Now you can access parts of your medical record, email your doctor's office, and request medication refills online. 1. In your internet browser, go to [email or web address] 2. Click on the First Time User? Click Here link in the Sign In box. You will see the New Member Sign Up page. 3. Enter your [employer] Access Code exactly as it appears below. You will not need to use this code after youve completed the sign-up process. If you do not sign up before the expiration date, you must request a new code. · [employer] Access Code: [email or web address] Expires: 11/12/2018  2:03 PM 
 
4. Enter the last four digits of your Social Security Number (xxxx) and Date of Birth (mm/dd/yyyy) as indicated and click Submit. You will be taken to the next sign-up page. 5. Create a [employer] ID. This will be your [employer] login ID and cannot be changed, so think of one that is secure and easy to remember. 6. Create a [employer] password. You can change your password at any time. 7. Enter your Password Reset Question and Answer. This can be used at a later time if you forget your password. 8. Enter your e-mail address. You will receive e-mail notification when new information is available in [address] 9. Click Sign Up. You can now view and download portions of your medical record. 10. Click the Download Summary menu link to download a portable copy of your medical information. If you have questions, please visit the Frequently Asked Questions section of the [employer] website. Remember, [employer] is NOT to be used for urgent needs. For medical emergencies, dial 911. Now available from your iPhone and Android! Please provide this summary of care documentation to your next provider. Your primary care clinician is listed as Humberto Galvez. If you have any questions after today's visit, please call [phone].
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
## 2018-09-21 NOTE — PROGRESS NOTES
Chief Complaint   Patient presents with   [address] Problem     follow up     1. Have you been to the ER, urgent care clinic since your last visit? Hospitalized since your last visit? [facility] for chest pains    2. Have you seen or consulted any other health care providers outside of the [address] [place] [address] since your last visit? Include any pap smears or colon screening. [place] cardiovascular specialist, and pulmonary DR.       Visit Vitals    /85 (BP 1 Location: Left arm, BP Patient Position: Sitting)    Pulse 91    Temp 98.5 °F (36.9 °C) (Oral)    Resp 20    Ht 5' 8\" (1.727 m)    Wt 57.2 kg (126 lb 3.2 oz)    SpO2 96%    BMI 19.19 kg/m2

## 2018-10-15 NOTE — ANESTHESIA POSTPROCEDURE EVALUATION
Post-Anesthesia Evaluation and Assessment Patient: Jose Lerner MRN: [id number]  SSN: [id number] YOB: 1980  Age: 34 y.o. Sex: male Cardiovascular Function/Vital Signs Visit Vitals  /73  Pulse 90  Temp 36.9 °C (98.4 °F)  Resp 23  
 Ht 5' 8\" (1.727 m)  Wt 56.2 kg (124 lb)  SpO2 97%  BMI 18.85 kg/m2 Patient is status post MAC anesthesia for Procedure(s): ESOPHAGOGASTRODUODENOSCOPY (EGD) ESOPHAGOGASTRODUODENAL (EGD) BIOPSY. Nausea/Vomiting: None Postoperative hydration reviewed and adequate. Pain: 
Pain Scale 1: Numeric (0 - 10) (10/15/18 1040) Managed Neurological Status: At baseline Mental Status and Level of Consciousness: Arousable Pulmonary Status:  
O2 Device: Room air (10/15/18 1105) Adequate oxygenation and airway patent Complications related to anesthesia: None Post-anesthesia assessment completed. No concerns Signed By: Toni Philip MD   
 October 15, 2018

## 2018-10-15 NOTE — DISCHARGE INSTRUCTIONS
[facility]  [id number]  1980    EGD DISCHARGE INSTRUCTIONS  Discomfort:  Sore throat- throat lozenges or warm salt water gargle  redness at IV site- apply warm compress to area; if redness or soreness persist- contact your physician  Gaseous discomfort- walking, belching will help relieve any discomfort  You may not operate a vehicle for 12 hours  You may not engage in an occupation involving machinery or appliances for rest of today. You may not drink alcoholic beverages for at least 12 hours  Avoid making any critical decisions for at least 24 hour  DIET  You may resume your regular diet - however -  remember your colon is empty and a heavy meal will produce gas. Avoid these foods:  vegetables, fried / greasy foods, carbonated drinks  ACTIVITY  You may resume your normal daily activities. Spend the remainder of the day resting -  avoid any strenuous activity. CALL M.D. ANY SIGN OF   Increasing pain, nausea, vomiting  Abdominal distension (swelling)  New increased bleeding (oral or rectal)  Fever (chills)  Pain in chest area  Bloody discharge from nose or mouth  Shortness of breath    You may not take any Advil, Aspirin, Ibuprofen, Motrin, Aleve, or Goodys try to stop meloxicam, ONLY  Tylenol as needed for pain.     Follow-up Instructions:   Call Dr. Farida Joiner office to make appointment for colonoscopy  Office telephone for problems or questions [phone]  Results of procedure / biopsy in 10-14 days

## 2018-10-15 NOTE — PROCEDURES
EGD Procedure Note Indications:  Recently Dx'd Coley's follow up Bx, see prior EGD note Referring Physician: Laura Mcdowell MD  
Anesthesia/Sedation:MAC Endoscopist:  Dr. Kunal Pro Assistant:  Endoscopy Technician-1: Esteban Whaley Endoscopy RN-1: Karine Han RN 
 
Preoperative diagnosis: MASS OF DUODENUM, EXCESSIVE WEIGHT LOSS, DYSPHAGIA, MORNING COUGH Postoperative diagnosis: 1. Hiatal Hernia 2. Barretts Esophagus Procedure in Detail: 
Informed consent was obtained for the procedure, including sedation. Risks of perforation, hemorrhage, adverse drug reaction, and aspiration were discussed. The patient was placed in the left lateral decubitus position. Based on the pre-procedure assessment, including review of the patient's medical history, medications, allergies, and review of systems, he had been deemed to be an appropriate candidate for moderate sedation; he was therefore sedated with the medications listed above. The patient was monitored continuously with ECG tracing, pulse oximetry, blood pressure monitoring, and direct observations. An Olympus video endoscope was inserted into the mouth and advanced under direct vision to into the esophagus, then stomach and duodenum. A careful inspection was made as the gastroscope was withdrawn, including a retroflexed view of the proximal stomach; findings and interventions are described below. Findings:  
Esophagus:2 cm hiatal hernia and Coley's extending from 39 cm to 32 cm with segmental 4-quadrant Bx at 2 cm intervals, persiostent candida esophagitis; erosion Bx at 37 cm no other acute inflammation Stomach: normal  
Duodenum/jejunum: normal - no masses noted in 2nd part of duodenum Therapies: see above Specimens: see above EBL: None Complications:   None; patient tolerated the procedure well. Recommendations: -Acid suppression with a proton pump inhibitor. , -Await pathology. , -Follow up with me., -No NSAIDS, -schedule colonoscopy and esophageal dilation if dysphagia persists Radha Navarro MD

## 2018-10-15 NOTE — PROGRESS NOTES
Chevy Emili 1980 
[id number] Situation: 
Verbal report received from: [facility] MEDICINE Procedure: Procedure(s): ESOPHAGOGASTRODUODENOSCOPY (EGD) ESOPHAGOGASTRODUODENAL (EGD) BIOPSY Background: 
 
Preoperative diagnosis: MASS OF DUODENUM, EXCESSIVE WEIGHT LOSS, DYSPHAGIA, MORNING COUGH Postoperative diagnosis: 1. Hiatal Hernia 2. Barretts Esophagus :  Dr. Shyanne Harvey Assistant(s): Endoscopy Technician-1: Leo Vargas Endoscopy RN-1: Orlin Gruber RN Specimens:  
ID Type Source Tests Collected by Time Destination 1 : Esophagus at 39cm Preservative   Natalie Whitehead MD 10/15/2018 1020 Pathology 2 : Esophagus at 37cm Carolative   Natalie Whitehead MD 10/15/2018 1022 Pathology 3 : Esophagus at 18 Maddi Trimble MD 10/15/2018 1024 Pathology 4 : Esophagus at 18 Maddi Trimble MD 10/15/2018 1026 Pathology 5 : Esophagus at 18 Maddi Trimble MD 10/15/2018 1028 Pathology H. Pylori  no Assessment: 
Intra-procedure medications Anesthesia gave intra-procedure sedation and medications, see anesthesia flow sheet yes Intravenous fluids: NS@ Codie Soto Vital signs stable Abdominal assessment: round and soft Recommendation: 
Discharge patient per MD order Return to floor Family or Friend Permission to share finding with family or friend yes Cbc drawn as ordered spec. To lab via volunteer
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
## 2018-10-15 NOTE — IP AVS SNAPSHOT
[address] [facility] 13 
[phone] Patient: Clara Ocasio MRN: [id number] ASV:[id number] About your hospitalization You were admitted on:  October 15, 2018 You last received care in the:  [address] [place] [address] ENDOSCOPY You were discharged on:  October 15, 2018 Why you were hospitalized Your primary diagnosis was:  Not on File Follow-up Information None Your Scheduled Appointments Thursday October 18, 2018 12:00 PM EDT Any with Laura Mcdowell MD  
[address] [place] [address] and Primary Care [address] [place] [address]) [address] [address]  
[phone] Friday November 09, 2018  2:40 PM EST Follow Up with Shay Weaver DO [facility] at [facility] AT [address] 13  
[phone] Thursday November 15, 2018  3:00 PM EST  
ESTABLISHED PATIENT with Theo Oscar NP [facility] ([address] [place] [address]) [address] [place] [address]  
[phone] Monday November 19, 2018  3:45 PM EST Any with Laura Mcdowell MD  
[address] [place] [address] and Primary Care [address] [place] [address]) [address] [address]  
[phone] Discharge Orders None A check erica indicates which time of day the medication should be taken. My Medications CONTINUE taking these medications Instructions Each Dose to Equal  
 Morning Noon Evening Bedtime * albuterol 2.5 mg /3 mL (0.083 %) nebulizer solution Commonly known as:  PROVENTIL VENTOLIN Your last dose was: Your next dose is:    
   
   
 3 mL by Nebulization route every four (4) hours as needed for Wheezing. 2.5 mg  
    
   
   
   
  
 * albuterol 90 mcg/actuation inhaler Commonly known as:  PROVENTIL HFA, VENTOLIN HFA, PROAIR HFA  
   
 Your last dose was: Your next dose is: Take 2 Puffs by inhalation every four (4) hours as needed for Wheezing. 2 Puff  
    
   
   
   
  
 amLODIPine 5 mg tablet Commonly known as:  Monse Mate Your last dose was: Your next dose is:    
   
   
 TK 1 T PO QD  
     
   
   
   
  
 ARIPiprazole 5 mg tablet Commonly known as:  ABILIFY Your last dose was: Your next dose is: TAKE 1 TABLET BY MOUTH DAILY  
     
   
   
   
  
 gabapentin 100 mg capsule Commonly known as:  NEURONTIN Your last dose was: Your next dose is: Take 1 Cap by mouth three (3) times daily. 100 mg  
    
   
   
   
  
 lamoTRIgine 100 mg Tr24 ER tablet Commonly known as: LaMICtal XR Your last dose was: Your next dose is: Take 100 mg by mouth nightly. 100 mg  
    
   
   
   
  
 meloxicam 15 mg tablet Commonly known as:  MOBIC Your last dose was: Your next dose is: Take 1 Tab by mouth daily. 15 mg  
    
   
   
   
  
 metFORMIN  mg tablet Commonly known as:  GLUCOPHAGE XR Your last dose was: Your next dose is: Take 1 Tab by mouth daily (with dinner). 500 mg  
    
   
   
   
  
 omeprazole 40 mg capsule Commonly known as:  PRILOSEC Your last dose was: Your next dose is: Take 40 mg by mouth Daily (before breakfast). 40 mg Oxygen Your last dose was: Your next dose is:    
   
   
 2 liter oxygen 24hr/day  
     
   
   
   
  
 prazosin 2 mg capsule Commonly known as:  MINIPRESS Your last dose was: Your next dose is: Take 1 Cap by mouth nightly. 2 mg  
    
   
   
   
  
 sertraline 100 mg tablet Commonly known as:  ZOLOFT Your last dose was: Your next dose is: Take 1.5 Tabs by mouth nightly.   
 150 mg  
    
   
   
 SPIRIVA WITH HANDIHALER 18 mcg inhalation capsule Generic drug:  tiotropium Your last dose was: Your next dose is: SYMBICORT 160-4.5 mcg/actuation Hfaa Generic drug:  budesonide-formoterol Your last dose was: Your next dose is: Take 2 Puffs by inhalation two (2) times a day. 2 Puff  
    
   
   
   
  
 traZODone 100 mg tablet Commonly known as:  Paulo Pena Your last dose was: Your next dose is: Take 1-2 Tabs by mouth nightly. 100-200 mg * Notice: This list has 2 medication(s) that are the same as other medications prescribed for you. Read the directions carefully, and ask your doctor or other care provider to review them with you. Discharge Instructions Harleen Peterson [id number] 1980 EGD DISCHARGE INSTRUCTIONS Discomfort: 
Sore throat- throat lozenges or warm salt water gargle 
redness at IV site- apply warm compress to area; if redness or soreness persist- contact your physician Gaseous discomfort- walking, belching will help relieve any discomfort You may not operate a vehicle for 12 hours You may not engage in an occupation involving machinery or appliances for rest of today. You may not drink alcoholic beverages for at least 12 hours Avoid making any critical decisions for at least 24 hour DIET You may resume your regular diet  however -  remember your colon is empty and a heavy meal will produce gas. Avoid these foods:  vegetables, fried / greasy foods, carbonated drinks ACTIVITY You may resume your normal daily activities. Spend the remainder of the day resting -  avoid any strenuous activity. CALL M.D. ANY SIGN OF Increasing pain, nausea, vomiting Abdominal distension (swelling) New increased bleeding (oral or rectal) Fever (chills) Pain in chest area Bloody discharge from nose or mouth Shortness of breath You may not take any Advil, Aspirin, Ibuprofen, Motrin, Aleve, or Goodys try to stop meloxicam, ONLY  Tylenol as needed for pain. Follow-up Instructions: 
 Call Dr. Eligio Pitts office to make appointment for colonoscopy Office telephone for problems or questions [phone] Results of procedure / biopsy in 10-14 days Introducing [facility]! [facility] introduces InEdge patient portal. Now you can access parts of your medical record, email your doctor's office, and request medication refills online. 1. In your internet browser, go to [email or web address] 2. Click on the First Time User? Click Here link in the Sign In box. You will see the New Member Sign Up page. 3. Enter your InEdge Access Code exactly as it appears below. You will not need to use this code after youve completed the sign-up process. If you do not sign up before the expiration date, you must request a new code. · InEdge Access Code: [email or web address] Expires: 11/12/2018  2:03 PM 
 
4. Enter the last four digits of your Social Security Number (xxxx) and Date of Birth (mm/dd/yyyy) as indicated and click Submit. You will be taken to the next sign-up page. 5. Create a InEdge ID. This will be your InEdge login ID and cannot be changed, so think of one that is secure and easy to remember. 6. Create a InEdge password. You can change your password at any time. 7. Enter your Password Reset Question and Answer. This can be used at a later time if you forget your password. 8. Enter your e-mail address. You will receive e-mail notification when new information is available in [address] 9. Click Sign Up. You can now view and download portions of your medical record. 10. Click the Download Summary menu link to download a portable copy of your medical information.  
 
If you have questions, please visit the Frequently Asked Questions section of the "[employer]. Remember, MyChart is NOT to be used for urgent needs. For medical emergencies, dial 911. Now available from your iPhone and Android! Introducing Yann Stubbs As a [employer] patient, I wanted to make you aware of our electronic visit tool called Yann Stubbs. [employer] 24/7 allows you to connect within minutes with a medical provider 24 hours a day, seven days a week via a mobile device or tablet or logging into a secure website from your computer. You can access Yann Stubbs from anywhere in the [place]. A virtual visit might be right for you when you have a simple condition and feel like you just dont want to get out of bed, or cant get away from work for an appointment, when your regular [employer] provider is not available (evenings, weekends or holidays), or when youre out of town and need minor care. Electronic visits cost only $49 and if the [employer] 24/7 provider determines a prescription is needed to treat your condition, one can be electronically transmitted to a nearby pharmacy*. Please take a moment to enroll today if you have not already done so. The enrollment process is free and takes just a few minutes. To enroll, please download the [employer] 24/7 reid to your tablet or phone, or visit [email or web address] to enroll on your computer. And, as an [address] [place] [address] patient with a [employer] account, the results of your visits will be scanned into your electronic medical record and your primary care provider will be able to view the scanned results. We urge you to continue to see your regular [employer] provider for your ongoing medical care. And while your primary care provider may not be the one available when you seek a Yann Stubbs virtual visit, the peace of mind you get from getting a real diagnosis real time can be priceless. For more information on Yann Stubbs, view our Frequently Asked Questions (FAQs) at [email or web address]. Sincerely, 
 
Jhon Gifford MD 
Chief Medical Officer Lewis Valadez *:  certain medications cannot be prescribed via Yann Stubbs Providers Seen During Your Hospitalization Provider Specialty Primary office phone Socorro Proctor MD Gastroenterology [phone] Your Primary Care Physician (PCP) Primary Care Physician Office Phone Office Fax Sue Winklerm [phone] You are allergic to the following Allergen Reactions Codeine Other (comments) Remeron (Mirtazapine) Other (comments)  
 jerks Seroquel (Quetiapine) Other (comments) tremors Recent Documentation Height Weight BMI Smoking Status 1.727 m 56.2 kg 18.85 kg/m2 Current Every Day Smoker Emergency Contacts Name Discharge Info Relation Home Work Mobile Ira Almanzar DISCHARGE CAREGIVER [3] Spouse [3] [phone] Ira Guerra  Spouse [3] [phone] Patient Belongings The following personal items are in your possession at time of discharge: 
  Dental Appliances: None  Visual Aid: None Please provide this summary of care documentation to your next provider. Signatures-by signing, you are acknowledging that this After Visit Summary has been reviewed with you and you have received a copy. Patient Signature:  ____________________________________________________________ Date:  ____________________________________________________________  
  
[facility] Old Provider Signature:  ____________________________________________________________ Date:  ____________________________________________________________

## 2018-10-15 NOTE — PROGRESS NOTES

## 2018-10-30 NOTE — PROGRESS NOTES
1. Have you been to the ER, urgent care clinic since your last visit? Hospitalized since your last visit? Yes When: 10-24-18 Reason for visit: SOB    2. Have you seen or consulted any other health care providers outside of the [address] [place] [address] since your last visit? Include any pap smears or colon screening.  Yes [place]     Wants to discuss ED and BS

## 2018-10-30 NOTE — LETTER
11/2/2018 7:38 AM 
 
Mr. Toñito Vasques [address] [facility] [address] Dear [place], Your lab studies are slightly abnormal. No medication changes are needed at this time. We will repeat the studies at your next visit. Please find your most recent results below Resulted Orders MICROALBUMIN, UR, RAND W/ MICROALB/CREAT RATIO Result Value Ref Range Creatinine, urine 70.7 Not Estab. mg/dL Microalbumin, urine <3.0 Not Estab. ug/mL Comment: **Verified by repeat analysis** Microalb/Creat ratio (ug/mg creat.) <4.2 0.0 - 30.0 mg/g creat Comment:  
                        Normal:                0.0 -  30.0 Albuminuria:          31.0 - 300.0 Clinical albuminuria:       >300.0 Narrative Performed at:  [address]  [id number] : Sage Finney MD, Phone:  [phone] CBC WITH AUTOMATED DIFF Result Value Ref Range WBC 8.2 3.4 - 10.8 x10E3/uL  
 RBC 4.68 4.14 - 5.80 x10E6/uL HGB 11.7 (L) 13.0 - 17.7 g/dL HCT 38.5 37.5 - 51.0 % MCV 82 79 - 97 fL  
 MCH 25.0 (L) 26.6 - 33.0 pg  
 MCHC 30.4 (L) 31.5 - 35.7 g/dL  
 RDW 16.4 (H) 12.3 - 15.4 % PLATELET 085 (H) 271 - 379 x10E3/uL NEUTROPHILS 69 Not Estab. % Lymphocytes 20 Not Estab. % MONOCYTES 8 Not Estab. % EOSINOPHILS 3 Not Estab. % BASOPHILS 0 Not Estab. %  
 ABS. NEUTROPHILS 5.6 1.4 - 7.0 x10E3/uL Abs Lymphocytes 1.7 0.7 - 3.1 x10E3/uL  
 ABS. MONOCYTES 0.7 0.1 - 0.9 x10E3/uL  
 ABS. EOSINOPHILS 0.2 0.0 - 0.4 x10E3/uL  
 ABS. BASOPHILS 0.0 0.0 - 0.2 x10E3/uL IMMATURE GRANULOCYTES 0 Not Estab. %  
 ABS. IMM. GRANS. 0.0 0.0 - 0.1 x10E3/uL Narrative Performed at:  [address]  [id number] : Sage Finney MD, Phone:  [phone] VITAMIN B12 & FOLATE Result Value Ref Range Vitamin B12 401 232 - 1,245 pg/mL Folate 4.1 >3.0 ng/mL Comment: A serum folate concentration of less than 3.1 ng/mL is 
considered to represent clinical deficiency. Narrative Performed at:  [address]  [id number] : Sage Finney MD, Phone:  [phone] IRON PROFILE Result Value Ref Range TIBC 465 (H) 250 - 450 ug/dL UIBC 444 (H) 111 - 343 ug/dL Iron 21 (L) 38 - 169 ug/dL Iron % saturation 5 (LL) 15 - 55 % Narrative Performed at:  [address]  [id number] : Sage Finney MD, Phone:  [phone] RETICULOCYTE COUNT Result Value Ref Range Reticulocyte count 1.4 0.6 - 2.6 % Narrative Performed at:  [address]  [id number] : Sage Finney MD, Phone:  [phone] GAMMOPATHY EVAL, SPEP/ELMO, IG QT/FLC Result Value Ref Range Immunoglobulin G, Qt. 807 700 - 1,600 mg/dL Immunoglobulin A, Qt. 387 (H) 90 - 386 mg/dL Immunoglobulin M, Qt. 57 20 - 172 mg/dL Protein, total 8.0 6.0 - 8.5 g/dL Albumin 4.2 2.9 - 4.4 g/dL Alpha-1-Globulin 0.4 0.0 - 0.4 g/dL Alpha-2-Globulin 1.1 (H) 0.4 - 1.0 g/dL Beta Globulin 1.5 (H) 0.7 - 1.3 g/dL Gamma Globulin 0.8 0.4 - 1.8 g/dL M-Jorge Not Observed Not Observed g/dL Globulin, total 3.8 2.2 - 3.9 g/dL A/G ratio 1.2 0.7 - 1.7 Immunofixation Result Comment Comment: An apparent polyclonal gammopathy: IgA. Kappa and lambda typing 
appear increased. Please note Comment Comment:  
   Protein electrophoresis scan will follow via computer, mail, or 
 delivery. Free Kappa Lt Chains, serum 23.0 (H) 3.3 - 19.4 mg/L Free Lambda Lt Chains, serum 19.4 5.7 - 26.3 mg/L Kappa/Lambda ratio, serum 1.19 0.26 - 1.65 Narrative Performed at:  [address]  [id number] : Darcy Blair MD, Phone:  [phone] FERRITIN Result Value Ref Range Ferritin 11 (L) 30 - 400 ng/mL Narrative Performed at:  [address]  [id number] : Darcy Blair MD, Phone:  [phone] Anjel Brennan MD, FACP, CMD
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
## 2018-11-09 NOTE — PROGRESS NOTES
Pt here for f/u on his seizures. Has not had any seizures since last visit, but he has now started shaking since he had pneumonia. Depression screen was already done.
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
## 2018-12-17 NOTE — TELEPHONE ENCOUNTER
Pt's ivone Brown called with  next to her and was wondering if you would be able to prescribe him something to sleep. He has not been able to sleep for 3 days due to marital stress. She is on his HIPPA so you are able to talk to her. Please call back when you have a moment.

## 2018-12-17 NOTE — TELEPHONE ENCOUNTER
Returned call. He reported that he is  from his wife and is not able to sleep well. He is taking trazodone and all his medications. Client was positive and stated that he will cope with this.  Advised to take melatonin if needed for insomnia and make appointment with a therapist. His cm can assist . Client agreed

## 2019-01-01 ENCOUNTER — OFFICE VISIT (OUTPATIENT)
Dept: BEHAVIORAL/MENTAL HEALTH CLINIC | Age: 39
End: 2019-01-01

## 2019-01-01 VITALS
BODY MASS INDEX: 20.03 KG/M2 | HEIGHT: 68 IN | SYSTOLIC BLOOD PRESSURE: 159 MMHG | DIASTOLIC BLOOD PRESSURE: 115 MMHG | OXYGEN SATURATION: 99 % | HEART RATE: 110 BPM | WEIGHT: 132.2 LBS

## 2019-01-01 VITALS
WEIGHT: 133.4 LBS | HEART RATE: 123 BPM | DIASTOLIC BLOOD PRESSURE: 101 MMHG | SYSTOLIC BLOOD PRESSURE: 163 MMHG | HEIGHT: 68 IN | OXYGEN SATURATION: 98 % | BODY MASS INDEX: 20.22 KG/M2

## 2019-01-01 VITALS
OXYGEN SATURATION: 97 % | HEART RATE: 112 BPM | WEIGHT: 140.6 LBS | BODY MASS INDEX: 21.31 KG/M2 | SYSTOLIC BLOOD PRESSURE: 158 MMHG | DIASTOLIC BLOOD PRESSURE: 97 MMHG | HEIGHT: 68 IN

## 2019-01-01 DIAGNOSIS — G47.00 INSOMNIA, UNSPECIFIED TYPE: ICD-10-CM

## 2019-01-01 DIAGNOSIS — F33.3 SEVERE RECURRENT DEPRESSION WITH PSYCHOSIS (HCC): Primary | ICD-10-CM

## 2019-01-01 DIAGNOSIS — F19.94 SUBSTANCE INDUCED MOOD DISORDER (HCC): ICD-10-CM

## 2019-01-01 DIAGNOSIS — F10.20: ICD-10-CM

## 2019-01-01 DIAGNOSIS — F33.1 DEPRESSION, MAJOR, RECURRENT, MODERATE (HCC): ICD-10-CM

## 2019-01-01 DIAGNOSIS — F43.10 PTSD (POST-TRAUMATIC STRESS DISORDER): ICD-10-CM

## 2019-01-01 DIAGNOSIS — F19.94 SUBSTANCE INDUCED MOOD DISORDER (HCC): Primary | ICD-10-CM

## 2019-01-01 DIAGNOSIS — F17.200 CONTINUOUS NICOTINE DEPENDENCE: ICD-10-CM

## 2019-01-01 DIAGNOSIS — F19.951: ICD-10-CM

## 2019-01-01 DIAGNOSIS — F06.31 MOOD DISORDER WITH DEPRESSIVE FEATURES DUE TO MEDICAL CONDITION: ICD-10-CM

## 2019-01-01 DIAGNOSIS — F17.218 NICOTINE DEPENDENCE, CIGARETTES, WITH OTHER NICOTINE-INDUCED DISORDERS: ICD-10-CM

## 2019-01-01 DIAGNOSIS — Z91.199 NON-COMPLIANCE: ICD-10-CM

## 2019-01-01 DIAGNOSIS — F14.90 COCAINE USE: ICD-10-CM

## 2019-01-01 DIAGNOSIS — F15.20 CAFFEINE DEPENDENCE, CONTINUOUS (HCC): ICD-10-CM

## 2019-01-01 RX ORDER — PRAZOSIN HYDROCHLORIDE 2 MG/1
CAPSULE ORAL
Qty: 90 CAP | Refills: 0 | Status: SHIPPED | OUTPATIENT
Start: 2019-01-01 | End: 2019-01-01 | Stop reason: SDUPTHER

## 2019-01-01 RX ORDER — TRAZODONE HYDROCHLORIDE 100 MG/1
TABLET ORAL
Refills: 1 | COMMUNITY
Start: 2019-01-01 | End: 2019-01-01

## 2019-01-01 RX ORDER — ARIPIPRAZOLE 10 MG/1
10 TABLET ORAL DAILY
Qty: 30 TAB | Refills: 0 | Status: SHIPPED | OUTPATIENT
Start: 2019-01-01

## 2019-01-01 RX ORDER — METFORMIN HYDROCHLORIDE 500 MG/1
TABLET, EXTENDED RELEASE ORAL
Qty: 30 TAB | Refills: 0 | Status: SHIPPED | OUTPATIENT
Start: 2019-01-01 | End: 2019-01-01 | Stop reason: SDUPTHER

## 2019-01-01 RX ORDER — ARIPIPRAZOLE 5 MG/1
5 TABLET ORAL DAILY
Qty: 90 TAB | Refills: 0 | Status: SHIPPED | OUTPATIENT
Start: 2019-01-01 | End: 2019-01-01 | Stop reason: SDUPTHER

## 2019-01-01 RX ORDER — TEMAZEPAM 7.5 MG/1
7.5 CAPSULE ORAL
Qty: 30 CAP | Refills: 0 | Status: SHIPPED | OUTPATIENT
Start: 2019-01-01

## 2019-01-01 RX ORDER — LEVETIRACETAM 500 MG/1
TABLET ORAL
Qty: 60 TAB | Refills: 0 | Status: SHIPPED | OUTPATIENT
Start: 2019-01-01

## 2019-01-01 RX ORDER — PREDNISONE 10 MG/1
TABLET ORAL
Refills: 1 | COMMUNITY
Start: 2019-01-01 | End: 2019-01-01 | Stop reason: SDUPTHER

## 2019-01-01 RX ORDER — ARIPIPRAZOLE 5 MG/1
TABLET ORAL
Qty: 30 TAB | Refills: 1 | Status: SHIPPED | OUTPATIENT
Start: 2019-01-01 | End: 2019-01-01 | Stop reason: SDUPTHER

## 2019-01-01 RX ORDER — IPRATROPIUM BROMIDE AND ALBUTEROL 20; 100 UG/1; UG/1
SPRAY, METERED RESPIRATORY (INHALATION)
Refills: 6 | COMMUNITY
Start: 2019-01-01

## 2019-01-01 RX ORDER — PREDNISONE 20 MG/1
TABLET ORAL
Refills: 0 | COMMUNITY
Start: 2019-01-01

## 2019-01-01 RX ORDER — AZITHROMYCIN 500 MG/1
TABLET, FILM COATED ORAL
COMMUNITY
Start: 2019-01-01 | End: 2019-01-01 | Stop reason: ALTCHOICE

## 2019-01-01 RX ORDER — CHOLECALCIFEROL (VITAMIN D3) 25 MCG
TABLET ORAL
Qty: 60 TAB | Refills: 2 | Status: SHIPPED | OUTPATIENT
Start: 2019-01-01 | End: 2019-01-01

## 2019-01-01 RX ORDER — TRAZODONE HYDROCHLORIDE 100 MG/1
100-200 TABLET ORAL
Qty: 60 TAB | Refills: 1 | Status: SHIPPED | OUTPATIENT
Start: 2019-01-01 | End: 2019-01-01 | Stop reason: SDUPTHER

## 2019-01-01 RX ORDER — SERTRALINE HYDROCHLORIDE 100 MG/1
TABLET, FILM COATED ORAL
Qty: 45 TAB | Refills: 1 | Status: SHIPPED | OUTPATIENT
Start: 2019-01-01 | End: 2019-01-01 | Stop reason: SDUPTHER

## 2019-01-01 RX ORDER — POLYETHYLENE GLYCOL 3350, SODIUM CHLORIDE, SODIUM BICARBONATE, POTASSIUM CHLORIDE 420; 11.2; 5.72; 1.48 G/4L; G/4L; G/4L; G/4L
POWDER, FOR SOLUTION ORAL
Refills: 0 | COMMUNITY
Start: 2018-01-01

## 2019-01-01 RX ORDER — PRAZOSIN HYDROCHLORIDE 2 MG/1
2 CAPSULE ORAL
Qty: 30 CAP | Refills: 1 | Status: SHIPPED | OUTPATIENT
Start: 2019-01-01 | End: 2019-01-01 | Stop reason: SDUPTHER

## 2019-01-01 RX ORDER — IBUPROFEN 200 MG
TABLET ORAL
Refills: 0 | COMMUNITY
Start: 2018-01-01

## 2019-01-01 RX ORDER — SERTRALINE HYDROCHLORIDE 100 MG/1
TABLET, FILM COATED ORAL
Qty: 60 TAB | Refills: 0 | Status: SHIPPED | OUTPATIENT
Start: 2019-01-01

## 2019-01-01 RX ORDER — TRAZODONE HYDROCHLORIDE 100 MG/1
100-200 TABLET ORAL
Qty: 180 TAB | Refills: 0 | OUTPATIENT
Start: 2019-01-01

## 2019-01-01 RX ORDER — SERTRALINE HYDROCHLORIDE 100 MG/1
200 TABLET, FILM COATED ORAL
Qty: 180 TAB | Refills: 0 | OUTPATIENT
Start: 2019-01-01

## 2019-01-01 RX ORDER — AMLODIPINE BESYLATE 5 MG/1
TABLET ORAL
Qty: 30 TAB | Refills: 0 | Status: SHIPPED | OUTPATIENT
Start: 2019-01-01

## 2019-01-01 RX ORDER — SERTRALINE HYDROCHLORIDE 100 MG/1
150 TABLET, FILM COATED ORAL DAILY
Qty: 135 TAB | Refills: 0 | Status: SHIPPED | OUTPATIENT
Start: 2019-01-01 | End: 2019-01-01 | Stop reason: SDUPTHER

## 2019-01-01 RX ORDER — ARIPIPRAZOLE 5 MG/1
TABLET ORAL
Qty: 90 TAB | Refills: 0 | OUTPATIENT
Start: 2019-01-01

## 2019-01-01 RX ORDER — PRAZOSIN HYDROCHLORIDE 2 MG/1
CAPSULE ORAL
Qty: 30 CAP | Refills: 0 | Status: SHIPPED | OUTPATIENT
Start: 2019-01-01

## 2019-01-01 RX ORDER — ASPIRIN/CALCIUM CARB/MAGNESIUM 325 MG
TABLET ORAL
Refills: 0 | COMMUNITY
Start: 2019-01-01

## 2019-01-01 RX ORDER — LEVOFLOXACIN 500 MG/1
TABLET, FILM COATED ORAL
Refills: 0 | COMMUNITY
Start: 2019-01-01
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
## (undated) DEVICE — BAG SPEC BIOHZD LF 2MIL 6X10IN -- CONVERT TO ITEM 357326

## (undated) DEVICE — CANN NASAL O2 CAPNOGRAPHY AD -- FILTERLINE

## (undated) DEVICE — ENDO CARRY-ON PROCEDURE KIT INCLUDES ENZYMATIC SPONGE, GAUZE, BIOHAZARD LABEL, TRAY, LUBRICANT, DIRTY SCOPE LABEL, WATER LABEL, TRAY, DRAWSTRING PAD, AND DEFENDO 4-PIECE KIT.: Brand: ENDO CARRY-ON PROCEDURE KIT

## (undated) DEVICE — FORCEPS BX L240CM JAW DIA2.8MM L CAP W/ NDL MIC MESH TOOTH

## (undated) DEVICE — SYRINGE MED 20ML STD CLR PLAS LUERLOCK TIP N CTRL DISP

## (undated) DEVICE — WRISTBAND ID AD W1XL11.5IN RED POLY ALRG PREPRINTED PERM

## (undated) DEVICE — BAG BELONG PT PERS CLEAR HANDL

## (undated) DEVICE — BITE BLK ENDOSCP AD 54FR GRN POLYETH ENDOSCP W STRP SLD

## (undated) DEVICE — CONTAINER SPEC 20 ML LID NEUT BUFF FORMALIN 10 % POLYPR STS

## (undated) DEVICE — KIT IV STRT W CHLORAPREP PD 1ML

## (undated) DEVICE — Z DISCONTINUED NO SUB IDED SET EXTN W/ 4 W STPCOCK M SPIN LOK 36IN

## (undated) DEVICE — Device

## (undated) DEVICE — SOLIDIFIER FLUID 3000 CC ABSORB

## (undated) DEVICE — BW-412T DISP COMBO CLEANING BRUSH: Brand: SINGLE USE COMBINATION CLEANING BRUSH

## (undated) DEVICE — CATH IV AUTOGRD BC BLU 22GA 25 -- INSYTE

## (undated) DEVICE — 1200 GUARD II KIT W/5MM TUBE W/O VAC TUBE: Brand: GUARDIAN

## (undated) DEVICE — NEEDLE HYPO 18GA L1.5IN PNK S STL HUB POLYPR SHLD REG BVL

## (undated) DEVICE — SET ADMIN 16ML TBNG L100IN 2 Y INJ SITE IV PIGGY BK DISP

## (undated) DEVICE — KENDALL RADIOLUCENT FOAM MONITORING ELECTRODE -RECTANGULAR SHAPE: Brand: KENDALL

## (undated) DEVICE — NEONATAL-ADULT SPO2 SENSOR: Brand: NELLCOR

## (undated) DEVICE — WIREGUIDED CYTOLOGY BRUSH: Brand: RX CYTOLOGY BRUSH